# Patient Record
Sex: MALE | Race: WHITE | Employment: FULL TIME | URBAN - METROPOLITAN AREA
[De-identification: names, ages, dates, MRNs, and addresses within clinical notes are randomized per-mention and may not be internally consistent; named-entity substitution may affect disease eponyms.]

---

## 2018-01-02 ENCOUNTER — TELEPHONE (OUTPATIENT)
Dept: OPHTHALMOLOGY | Facility: CLINIC | Age: 38
End: 2018-01-02

## 2018-01-02 NOTE — TELEPHONE ENCOUNTER
Pt requesting an appt. Pt's right eye iris is inflamed. Pt was seen in the past for iritis in the left eye.   Call pt to advise

## 2018-01-03 ENCOUNTER — OFFICE VISIT (OUTPATIENT)
Dept: OPHTHALMOLOGY | Facility: CLINIC | Age: 38
End: 2018-01-03

## 2018-01-03 DIAGNOSIS — H20.9 IRITIS: Primary | ICD-10-CM

## 2018-01-03 PROCEDURE — 99213 OFFICE O/P EST LOW 20 MIN: CPT | Performed by: OPHTHALMOLOGY

## 2018-01-03 PROCEDURE — 99212 OFFICE O/P EST SF 10 MIN: CPT | Performed by: OPHTHALMOLOGY

## 2018-01-03 NOTE — ASSESSMENT & PLAN NOTE
Used Pred Forte one drop every 2 hours while awake  x 1 week. Homatropine instilled in the office today. Informed pt that his right eye will be dilated for 3-4 days.

## 2018-01-03 NOTE — PATIENT INSTRUCTIONS
Iritis  Used Pred Forte one drop every 2 hours while awake  x 1 week. Homatropine instilled in the office today. Informed pt that his right eye will be dilated for 3-4 days.

## 2018-01-03 NOTE — PROGRESS NOTES
Prasanna Tolbert is a 40year old male. HPI:     HPI     Pt called yesterday complaining of pain 5/10, redness, light sensitivity and slight blurred vision OD for 6 days.  Pt has Hx of iritis OS and has been using left over Pred Forte 3 times a day OD bu every 2 hours while awake  x 1 week. Homatropine instilled in the office today. Informed pt that his right eye will be dilated for 3-4 days. No orders of the defined types were placed in this encounter.       Meds This Visit:    No prescriptions req

## 2018-01-10 ENCOUNTER — OFFICE VISIT (OUTPATIENT)
Dept: OPHTHALMOLOGY | Facility: CLINIC | Age: 38
End: 2018-01-10

## 2018-01-10 DIAGNOSIS — H20.00 ACUTE IRITIS, RIGHT EYE: Primary | ICD-10-CM

## 2018-01-10 PROCEDURE — 99213 OFFICE O/P EST LOW 20 MIN: CPT | Performed by: OPHTHALMOLOGY

## 2018-01-10 PROCEDURE — 99212 OFFICE O/P EST SF 10 MIN: CPT | Performed by: OPHTHALMOLOGY

## 2018-01-10 RX ORDER — PREDNISOLONE ACETATE 10 MG/ML
1 SUSPENSION/ DROPS OPHTHALMIC 4 TIMES DAILY
Qty: 1 BOTTLE | Refills: 0 | Status: SHIPPED | OUTPATIENT
Start: 2018-01-10 | End: 2018-01-23

## 2018-01-10 NOTE — ASSESSMENT & PLAN NOTE
Since this is the 2nd episode of iritis, to consider referral to rheumatologist for iritis work up. Patient has an appointment with Dr. Saloni Vera next week. Patient states his mother has ankylosing spondylitis.     Iritis in the right eye is improve

## 2018-01-10 NOTE — PATIENT INSTRUCTIONS
Acute iritis, right eye  Since this is the 2nd episode of iritis, to consider referral to rheumatologist for iritis work up. Patient has an appointment with Dr. Vail Overall next week. Patient states his mother has ankylosing spondylitis.     Iritis in

## 2018-01-10 NOTE — PROGRESS NOTES
Demi Mitchell is a 40year old male. HPI:     HPI     Patient is here for a recheck of iritis OD. Patient is using Pred Forte OD every 2 hours (ramesht used it last night @10pm).   Patient states that the eye feels better but he still has blurry vis Disc Good rim, Temporal crescent     C/D Ratio 0.4             Refraction     Wearing Rx       Sphere Cylinder Axis    Right -5.50 +0.00 000    Left -5.50 +0.00 000    Type:  Single vision                 ASSESSMENT/PLAN:     Diagnoses and Plan:     Acute

## 2018-01-23 ENCOUNTER — OFFICE VISIT (OUTPATIENT)
Dept: OPHTHALMOLOGY | Facility: CLINIC | Age: 38
End: 2018-01-23

## 2018-01-23 DIAGNOSIS — H20.00 ACUTE IRITIS, RIGHT EYE: Primary | ICD-10-CM

## 2018-01-23 PROCEDURE — 99213 OFFICE O/P EST LOW 20 MIN: CPT | Performed by: OPHTHALMOLOGY

## 2018-01-23 PROCEDURE — 99212 OFFICE O/P EST SF 10 MIN: CPT | Performed by: OPHTHALMOLOGY

## 2018-01-23 RX ORDER — PREDNISOLONE ACETATE 10 MG/ML
SUSPENSION/ DROPS OPHTHALMIC
Qty: 1 BOTTLE | Refills: 0 | Status: SHIPPED | OUTPATIENT
Start: 2018-01-23 | End: 2018-02-13

## 2018-01-23 NOTE — PATIENT INSTRUCTIONS
Acute iritis, right eye  Improved. Patient has an appointment with Dr. Chriss Durán on 2/7/18. Begin to wean off of Pred forte.    Use 1 drop 3 times a day in the right eye for 7 days, then 2 times a day for 7 days, then once a day for 7 days, then discon

## 2018-01-23 NOTE — PROGRESS NOTES
Jen Zamora is a 40year old male. HPI:     HPI     Patient is here for a recheck of iritis OD. Patient is using Pred forte OD QID. He complains of occasional blurred vision in the right eye, but states his right eye feels much better.   Patient h Iris Normal Normal    Lens minimal pigment on anterior capsule  Clear          Fundus Exam       Right Left    Disc Good rim, Temporal crescent     C/D Ratio 0.4             Refraction     Wearing Rx       Sphere Cylinder Axis    Right -5.50 +0.00 000

## 2018-01-23 NOTE — ASSESSMENT & PLAN NOTE
Improved. Patient has an appointment with Dr. Aaron Garcia on 2/7/18. Begin to wean off of Pred forte. Use 1 drop 3 times a day in the right eye for 7 days, then 2 times a day for 7 days, then once a day for 7 days, then discontinue.   Discussed with pa

## 2018-02-07 ENCOUNTER — HOSPITAL ENCOUNTER (OUTPATIENT)
Dept: GENERAL RADIOLOGY | Facility: HOSPITAL | Age: 38
Discharge: HOME | End: 2018-02-07
Attending: INTERNAL MEDICINE
Payer: COMMERCIAL

## 2018-02-07 ENCOUNTER — OFFICE VISIT (OUTPATIENT)
Dept: RHEUMATOLOGY | Facility: CLINIC | Age: 38
End: 2018-02-07

## 2018-02-07 ENCOUNTER — APPOINTMENT (OUTPATIENT)
Dept: LAB | Facility: HOSPITAL | Age: 38
End: 2018-02-07
Attending: INTERNAL MEDICINE
Payer: COMMERCIAL

## 2018-02-07 VITALS
BODY MASS INDEX: 31.92 KG/M2 | HEIGHT: 71 IN | DIASTOLIC BLOOD PRESSURE: 86 MMHG | WEIGHT: 228 LBS | SYSTOLIC BLOOD PRESSURE: 144 MMHG | TEMPERATURE: 97 F | HEART RATE: 69 BPM

## 2018-02-07 DIAGNOSIS — M54.89 INFLAMMATORY BACK PAIN: ICD-10-CM

## 2018-02-07 DIAGNOSIS — H20.9 IRITIS: Primary | ICD-10-CM

## 2018-02-07 DIAGNOSIS — H20.9 IRITIS: ICD-10-CM

## 2018-02-07 LAB
ALBUMIN SERPL BCP-MCNC: 4.3 G/DL (ref 3.5–4.8)
ALBUMIN/GLOB SERPL: 1.7 {RATIO} (ref 1–2)
ALP SERPL-CCNC: 75 U/L (ref 32–100)
ALT SERPL-CCNC: 40 U/L (ref 17–63)
ANION GAP SERPL CALC-SCNC: 7 MMOL/L (ref 0–18)
AST SERPL-CCNC: 29 U/L (ref 15–41)
BILIRUB SERPL-MCNC: 0.3 MG/DL (ref 0.3–1.2)
BUN SERPL-MCNC: 14 MG/DL (ref 8–20)
BUN/CREAT SERPL: 14.1 (ref 10–20)
CALCIUM SERPL-MCNC: 9 MG/DL (ref 8.5–10.5)
CHLORIDE SERPL-SCNC: 104 MMOL/L (ref 95–110)
CK SERPL-CCNC: 180 U/L (ref 49–397)
CO2 SERPL-SCNC: 29 MMOL/L (ref 22–32)
CREAT SERPL-MCNC: 0.99 MG/DL (ref 0.5–1.5)
CRP SERPL-MCNC: <0.5 MG/DL (ref 0–0.9)
ERYTHROCYTE [DISTWIDTH] IN BLOOD BY AUTOMATED COUNT: 12.7 % (ref 11–15)
ERYTHROCYTE [SEDIMENTATION RATE] IN BLOOD: 4 MM/HR (ref 0–15)
GLOBULIN PLAS-MCNC: 2.5 G/DL (ref 2.5–3.7)
GLUCOSE SERPL-MCNC: 98 MG/DL (ref 70–99)
HCT VFR BLD AUTO: 46 % (ref 41–52)
HGB BLD-MCNC: 15.8 G/DL (ref 13.5–17.5)
MCH RBC QN AUTO: 29.7 PG (ref 27–32)
MCHC RBC AUTO-ENTMCNC: 34.4 G/DL (ref 32–37)
MCV RBC AUTO: 86.2 FL (ref 80–100)
OSMOLALITY UR CALC.SUM OF ELEC: 290 MOSM/KG (ref 275–295)
PATIENT FASTING: NO
PLATELET # BLD AUTO: 204 K/UL (ref 140–400)
PMV BLD AUTO: 8.7 FL (ref 7.4–10.3)
POTASSIUM SERPL-SCNC: 4 MMOL/L (ref 3.3–5.1)
PROT SERPL-MCNC: 6.8 G/DL (ref 5.9–8.4)
RBC # BLD AUTO: 5.34 M/UL (ref 4.5–5.9)
RHEUMATOID FACT SER QL: <5 IU/ML
SODIUM SERPL-SCNC: 140 MMOL/L (ref 136–144)
WBC # BLD AUTO: 5.9 K/UL (ref 4–11)

## 2018-02-07 PROCEDURE — 82164 ANGIOTENSIN I ENZYME TEST: CPT

## 2018-02-07 PROCEDURE — 99244 OFF/OP CNSLTJ NEW/EST MOD 40: CPT | Performed by: INTERNAL MEDICINE

## 2018-02-07 PROCEDURE — 82550 ASSAY OF CK (CPK): CPT

## 2018-02-07 PROCEDURE — 86431 RHEUMATOID FACTOR QUANT: CPT

## 2018-02-07 PROCEDURE — 86812 HLA TYPING A B OR C: CPT

## 2018-02-07 PROCEDURE — 86140 C-REACTIVE PROTEIN: CPT

## 2018-02-07 PROCEDURE — 86618 LYME DISEASE ANTIBODY: CPT

## 2018-02-07 PROCEDURE — 86803 HEPATITIS C AB TEST: CPT

## 2018-02-07 PROCEDURE — 36415 COLL VENOUS BLD VENIPUNCTURE: CPT

## 2018-02-07 PROCEDURE — 86480 TB TEST CELL IMMUN MEASURE: CPT

## 2018-02-07 PROCEDURE — 86705 HEP B CORE ANTIBODY IGM: CPT

## 2018-02-07 PROCEDURE — 87340 HEPATITIS B SURFACE AG IA: CPT

## 2018-02-07 PROCEDURE — 86038 ANTINUCLEAR ANTIBODIES: CPT

## 2018-02-07 PROCEDURE — 72070 X-RAY EXAM THORAC SPINE 2VWS: CPT | Performed by: INTERNAL MEDICINE

## 2018-02-07 PROCEDURE — 85027 COMPLETE CBC AUTOMATED: CPT

## 2018-02-07 PROCEDURE — 80053 COMPREHEN METABOLIC PANEL: CPT

## 2018-02-07 PROCEDURE — 72202 X-RAY EXAM SI JOINTS 3/> VWS: CPT | Performed by: INTERNAL MEDICINE

## 2018-02-07 PROCEDURE — 85652 RBC SED RATE AUTOMATED: CPT

## 2018-02-07 PROCEDURE — 85549 MURAMIDASE: CPT

## 2018-02-07 PROCEDURE — 86200 CCP ANTIBODY: CPT

## 2018-02-07 PROCEDURE — 99212 OFFICE O/P EST SF 10 MIN: CPT | Performed by: INTERNAL MEDICINE

## 2018-02-07 PROCEDURE — 86780 TREPONEMA PALLIDUM: CPT

## 2018-02-07 RX ORDER — IBUPROFEN 200 MG
600 TABLET ORAL NIGHTLY PRN
COMMUNITY

## 2018-02-07 RX ORDER — CETIRIZINE HYDROCHLORIDE 10 MG/1
10 TABLET ORAL DAILY
COMMUNITY

## 2018-02-07 RX ORDER — NAPROXEN SODIUM 220 MG
TABLET ORAL AS NEEDED
COMMUNITY
End: 2021-04-09

## 2018-02-07 NOTE — PATIENT INSTRUCTIONS
1. Check labs  2. Cont. ibuporfen 600mg twice a day   3. Check xrays   4. Return to clinic in 2 weeks.

## 2018-02-07 NOTE — PROGRESS NOTES
Xavier Irving is a 40year old male who presents for Patient presents with:  Joint Pain: past dx iritis  Back Pain: mid  Hip Pain: right  . HPI:     I had the pleasure of seeing Xavier Irving on 2/7/2018 for evaluation.  He was referred by Dr. Hank Diane ibuprofen 200 MG Oral Tab Take 600 mg by mouth nightly as needed for Pain. Disp:  Rfl:    Naproxen Sodium (ALEVE) 220 MG Oral Tab Take by mouth as needed.  Disp:  Rfl:    prednisoLONE acetate (PRED FORTE) 1 % Ophthalmic Suspension Instill 1 drop into the heartburn, no dyshpagia, no BRBPR or melena  When he eats breads or sandwhiches - he can get heart burn.    : no dysuria,   Neuro: No numbness or tingling, no headache, no hx of seizures,   Psych: no hx of anxiety or depression  ENDO: no hx of thyroid dis with:  Joint Pain: past dx iritis  Back Pain: mid  Hip Pain: right    1.  Recurrent irtiis - hx of intnermittent back pain and righ gulshan apin -   Fh of AS  - Check labs to evaluate for inflammatory arthritis and/or connective tissue disease  That would caus

## 2018-02-08 LAB
CCP IGG SERPL-ACNC: 2 U/ML (ref 0–6.9)
HBV CORE IGM SERPL QL IA: NONREACTIVE
HBV SURFACE AG SERPL QL IA: NONREACTIVE
HCV AB SERPL QL IA: NONREACTIVE
NUCLEAR IGG TITR SER IF: NEGATIVE {TITER}

## 2018-02-09 LAB
ANGIOTENSIN CONVERTING ENZYME: 59 U/L
B BURGDOR IGG+IGM SER QL: NEGATIVE
HLA-B27: POSITIVE
M TB IFN-G CD4+ BCKGRND COR BLD-ACNC: 0.02 IU/ML
M TB IFN-G CD4+ T-CELLS BLD-ACNC: 0.06 IU/ML
M TB TUBERC IFN-G BLD QL: NEGATIVE
M TB TUBERC IGNF/MITOGEN IGNF CONTROL: >10 IU/ML
T PALLIDUM AB SER QL: NEGATIVE

## 2018-02-12 ENCOUNTER — TELEPHONE (OUTPATIENT)
Dept: RHEUMATOLOGY | Facility: CLINIC | Age: 38
End: 2018-02-12

## 2018-02-12 LAB — LYSOZYME, SERUM OR BODY FLUID: 1.2 UG/ML

## 2018-02-21 ENCOUNTER — HOSPITAL ENCOUNTER (OUTPATIENT)
Dept: GENERAL RADIOLOGY | Facility: HOSPITAL | Age: 38
Discharge: HOME | End: 2018-02-21
Attending: INTERNAL MEDICINE
Payer: COMMERCIAL

## 2018-02-21 ENCOUNTER — OFFICE VISIT (OUTPATIENT)
Dept: RHEUMATOLOGY | Facility: CLINIC | Age: 38
End: 2018-02-21

## 2018-02-21 ENCOUNTER — TELEPHONE (OUTPATIENT)
Dept: RHEUMATOLOGY | Facility: CLINIC | Age: 38
End: 2018-02-21

## 2018-02-21 VITALS
HEART RATE: 74 BPM | BODY MASS INDEX: 32.06 KG/M2 | SYSTOLIC BLOOD PRESSURE: 143 MMHG | DIASTOLIC BLOOD PRESSURE: 83 MMHG | WEIGHT: 229 LBS | HEIGHT: 71 IN

## 2018-02-21 DIAGNOSIS — M25.551 RIGHT HIP PAIN: ICD-10-CM

## 2018-02-21 DIAGNOSIS — M45.9 ANKYLOSING SPONDYLITIS, UNSPECIFIED SITE OF SPINE (HCC): Primary | ICD-10-CM

## 2018-02-21 PROCEDURE — 73502 X-RAY EXAM HIP UNI 2-3 VIEWS: CPT | Performed by: INTERNAL MEDICINE

## 2018-02-21 PROCEDURE — 99212 OFFICE O/P EST SF 10 MIN: CPT | Performed by: INTERNAL MEDICINE

## 2018-02-21 PROCEDURE — 99214 OFFICE O/P EST MOD 30 MIN: CPT | Performed by: INTERNAL MEDICINE

## 2018-02-21 NOTE — PROGRESS NOTES
Vladimir Young is a 40year old male who presents for Patient presents with:  Hip Pain:  right,Iritis  Results  . HPI:     I had the pleasure of seeing Vladimir Young on 2/7/2018 for evaluation. He was referred by Dr. Aaron Bang.      Over the last 1 1/ 10 MG Oral Tab Take 10 mg by mouth daily. Disp:  Rfl:    ibuprofen 200 MG Oral Tab Take 600 mg by mouth nightly as needed for Pain. Disp:  Rfl:    Naproxen Sodium (ALEVE) 220 MG Oral Tab Take by mouth as needed.  Disp:  Rfl:    Fexofenadine-Pseudoephed ER 1 when he was younger he used to get folloiciulitis it goes away -   All other ROS are negative.      EXAM:   /83 (BP Location: Right arm, Patient Position: Sitting, Cuff Size: adult)   Pulse 74   Ht 5' 11\" (1.803 m)   Wt 229 lb (103.9 kg)   BMI 31.94 but postponed   - plan to start biologics - humira 40mg every 2 weeks. - check mri si joints -     2. oa of hips - had xrays with dr. Karissa Sykes and did not get the arthrsocpic sx - in 2016 -       Summary:  1. Check mri si joints   2.  Planning humira 40mg e

## 2018-02-21 NOTE — PATIENT INSTRUCTIONS
1. Check mri si joints   2. Planning humira 40mg every other 2 weeks. 3. Info on humira   4. Return to clinic in 1 month. Adalimumab Injection  Brand Names: Toney Zarate  What is this medicine?   ADALIMUMAB (a rachele smiley mab) is used to treat rheumat · unusual bleeding or bruising  · unusually weak or tired  Side effects that usually do not require medical attention (report to your doctor or health care professional if they continue or are bothersome):  · headache  · nausea  · redness, itching, swellin What should I watch for while using this medicine? Visit your doctor or health care professional for regular checks on your progress. Tell your doctor or healthcare professional if your symptoms do not start to get better or if they get worse.   You will b

## 2018-02-22 ENCOUNTER — TELEPHONE (OUTPATIENT)
Dept: RHEUMATOLOGY | Facility: CLINIC | Age: 38
End: 2018-02-22

## 2018-02-22 NOTE — TELEPHONE ENCOUNTER
Insurance contacted at [phone]. No PA required ([phone]). Ref#[id number]. Order faxed to [facility] phone [phone] fax [phone]. Left message for pt approved and he can schedule MRI.

## 2018-02-27 ENCOUNTER — TELEPHONE (OUTPATIENT)
Dept: RHEUMATOLOGY | Facility: CLINIC | Age: 38
End: 2018-02-27

## 2018-02-27 NOTE — TELEPHONE ENCOUNTER
Ok to let him know. 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

## 2018-02-27 NOTE — TELEPHONE ENCOUNTER
Pt had MRI completed yesterday. Scheduled teaching for 2/28 at 9 am in [facility].

## 2018-03-01 ENCOUNTER — TELEPHONE (OUTPATIENT)
Dept: OPHTHALMOLOGY | Facility: CLINIC | Age: 38
End: 2018-03-01

## 2018-03-01 ENCOUNTER — NURSE ONLY (OUTPATIENT)
Dept: RHEUMATOLOGY | Facility: CLINIC | Age: 38
End: 2018-03-01

## 2018-03-01 DIAGNOSIS — M45.9 ANKYLOSING SPONDYLITIS, UNSPECIFIED SITE OF SPINE (HCC): Primary | ICD-10-CM

## 2018-03-01 PROCEDURE — 99211 OFF/OP EST MAY X REQ PHY/QHP: CPT | Performed by: INTERNAL MEDICINE

## 2018-03-01 NOTE — PROGRESS NOTES
Patient came in for [facility] teaching. Name and  verified. Patient educated on proper handling/storage/disposal of medications. Patient informed of proper injection and aseptic technique. Patient educated on potential side effects.  Educational materials g

## 2018-03-01 NOTE — PATIENT INSTRUCTIONS
Adalimumab Injection  Brand Names: Florentino Villela  What is this medicine? ADALIMUMAB (a rachele AYE mu mab) is used to treat rheumatoid and psoriatic arthritis.  It is also used to treat ankylosing spondylitis, Crohn's disease, ulcerative colitis, plaque ps Side effects that usually do not require medical attention (report to your doctor or health care professional if they continue or are bothersome):  · headache  · nausea  · redness, itching, swelling, or bruising at site where injected  What may interact wi Visit your doctor or health care professional for regular checks on your progress. Tell your doctor or healthcare professional if your symptoms do not start to get better or if they get worse.   You will be tested for tuberculosis (TB) before you start this

## 2018-04-02 ENCOUNTER — HOSPITAL ENCOUNTER (EMERGENCY)
Facility: HOSPITAL | Age: 38
Discharge: HOME | End: 2018-04-02
Attending: EMERGENCY MEDICINE
Payer: COMMERCIAL

## 2018-04-02 VITALS
HEART RATE: 70 BPM | HEIGHT: 71 IN | RESPIRATION RATE: 18 BRPM | TEMPERATURE: 98 F | OXYGEN SATURATION: 100 % | WEIGHT: 220 LBS | SYSTOLIC BLOOD PRESSURE: 133 MMHG | BODY MASS INDEX: 30.8 KG/M2 | DIASTOLIC BLOOD PRESSURE: 89 MMHG

## 2018-04-02 DIAGNOSIS — S01.81XA FACIAL LACERATION, INITIAL ENCOUNTER: Primary | ICD-10-CM

## 2018-04-02 PROCEDURE — 90471 IMMUNIZATION ADMIN: CPT

## 2018-04-02 PROCEDURE — 12011 RPR F/E/E/N/L/M 2.5 CM/<: CPT

## 2018-04-02 PROCEDURE — 99283 EMERGENCY DEPT VISIT LOW MDM: CPT

## 2018-04-02 RX ORDER — LIDOCAINE HYDROCHLORIDE AND EPINEPHRINE 20; 5 MG/ML; UG/ML
20 INJECTION, SOLUTION EPIDURAL; INFILTRATION; INTRACAUDAL; PERINEURAL ONCE
Status: COMPLETED | OUTPATIENT
Start: 2018-04-02 | End: 2018-04-02

## 2018-04-02 RX ORDER — DIAPER,BRIEF,INFANT-TODD,DISP
EACH MISCELLANEOUS ONCE
Status: COMPLETED | OUTPATIENT
Start: 2018-04-02 | End: 2018-04-02

## 2018-04-03 ENCOUNTER — TELEPHONE (OUTPATIENT)
Dept: RHEUMATOLOGY | Facility: CLINIC | Age: 38
End: 2018-04-03

## 2018-04-03 DIAGNOSIS — Z51.81 ENCOUNTER FOR THERAPEUTIC DRUG MONITORING: ICD-10-CM

## 2018-04-03 DIAGNOSIS — M45.9 ANKYLOSING SPONDYLITIS, UNSPECIFIED SITE OF SPINE (HCC): Primary | ICD-10-CM

## 2018-04-03 NOTE — ED PROVIDER NOTES
Patient Seen in: [facility] AND [facility] Emergency Department    History   Patient presents with:  Laceration Abrasion (integumentary)    Stated Complaint: left face lac      HPI    40 yo M with PMH ankylosing spondylitis on humira presmeting for evaluation of l signs reviewed. All other systems reviewed and negative except as noted above. PSFH elements reviewed from today and agreed except as otherwise stated in HPI.     Physical Exam   ED Triage Vitals [04/02/18 1234]  BP: 145/75  Pulse: 95  Resp: 18  Tem laceration, initial encounter  (primary encounter diagnosis)    Disposition:  Discharge    Follow-up:  [address] [address]  Go to  Followup in 3-5 days for wound check and suture removal.      Medicatio

## 2018-04-03 NOTE — ED NOTES
Pt reports he was hit in the face next to left eye by a hockey puck. Laceration approximately 1.5 cm long. Bleeding controlled at this time. Pt denies any visual disturbances. Denies LOC.

## 2018-04-03 NOTE — ED INITIAL ASSESSMENT (HPI)
c/o L eye lac since tonight after being struck in face with puck during hockey, denies loc, denies visual disturbance

## 2018-04-04 NOTE — TELEPHONE ENCOUNTER
Pt states went to ER on 4/3/2018 and got a tetnus shot and stitches in his face, he wanted to know will the tetnus shot effect the Humira/ labs.

## 2018-04-05 ENCOUNTER — LAB ENCOUNTER (OUTPATIENT)
Dept: LAB | Facility: HOSPITAL | Age: 38
End: 2018-04-05
Attending: INTERNAL MEDICINE
Payer: COMMERCIAL

## 2018-04-05 DIAGNOSIS — M45.9 ANKYLOSING SPONDYLITIS, UNSPECIFIED SITE OF SPINE (HCC): ICD-10-CM

## 2018-04-05 DIAGNOSIS — Z51.81 ENCOUNTER FOR THERAPEUTIC DRUG MONITORING: ICD-10-CM

## 2018-04-05 PROCEDURE — 85652 RBC SED RATE AUTOMATED: CPT

## 2018-04-05 PROCEDURE — 84450 TRANSFERASE (AST) (SGOT): CPT

## 2018-04-05 PROCEDURE — 82040 ASSAY OF SERUM ALBUMIN: CPT

## 2018-04-05 PROCEDURE — 84460 ALANINE AMINO (ALT) (SGPT): CPT

## 2018-04-05 PROCEDURE — 36415 COLL VENOUS BLD VENIPUNCTURE: CPT

## 2018-04-05 PROCEDURE — 82565 ASSAY OF CREATININE: CPT

## 2018-04-05 PROCEDURE — 85025 COMPLETE CBC W/AUTO DIFF WBC: CPT

## 2018-04-05 PROCEDURE — 86140 C-REACTIVE PROTEIN: CPT

## 2018-04-09 ENCOUNTER — OFFICE VISIT (OUTPATIENT)
Dept: RHEUMATOLOGY | Facility: CLINIC | Age: 38
End: 2018-04-09

## 2018-04-09 VITALS
WEIGHT: 217 LBS | DIASTOLIC BLOOD PRESSURE: 77 MMHG | SYSTOLIC BLOOD PRESSURE: 124 MMHG | BODY MASS INDEX: 30.38 KG/M2 | HEIGHT: 71 IN | HEART RATE: 59 BPM

## 2018-04-09 DIAGNOSIS — M45.9 ANKYLOSING SPONDYLITIS, UNSPECIFIED SITE OF SPINE (HCC): Primary | ICD-10-CM

## 2018-04-09 DIAGNOSIS — Z51.81 ENCOUNTER FOR THERAPEUTIC DRUG MONITORING: ICD-10-CM

## 2018-04-09 PROCEDURE — 99212 OFFICE O/P EST SF 10 MIN: CPT | Performed by: INTERNAL MEDICINE

## 2018-04-09 PROCEDURE — 99214 OFFICE O/P EST MOD 30 MIN: CPT | Performed by: INTERNAL MEDICINE

## 2018-04-09 NOTE — PROGRESS NOTES
Tracy Atkins is a 41year old male who presents for Patient presents with: Ankylosing Spondylitis  Joint Pain  . HPI:     I had the pleasure of seeing Tracy Atkins on 2/7/2018 for evaluation. He was referred by Dr. Maryam Dang.      Over the last 1 better. He has no pain now. His pain used to be 5/10 pain. It varied by day. He has noticed a minor cold/sinus thing one day after injeciton. No ibuporfen or aleve since started ibuporfne. He's had no injection site reactions.          Wt Readings no psoriasis  He gets dry skin on his thighs.    HEENT: No dry eyes, no dry mouth, no Raynaud's, no nasal ulcers, no parotid swelling, no neck pain, no jaw pain, no temple pain  Eyes: No visual changes,   CVS: No chest pain, no heart disease  RS: No SOB, no PHOSPHATASE      32 - 100 U/L 75   Total Bilirubin      0.3 - 1.2 mg/dL 0.3   TOTAL PROTEIN      5.9 - 8.4 g/dL 6.8   Albumin      3.5 - 4.8 g/dL 4.3   GLOBULIN, TOTAL      2.5 - 3.7 g/dL 2.5   A/G RATIO      1.0 - 2.0 1.7   ANION GAP      0 - 18 mmol/L 7 217   MEAN PLATELET VOLUME      7.4 - 10.3 fL 9.5   Neutrophils %      % 61   Lymphocytes %      % 29   Monocytes %      % 8   Eosinophils %      % 2   Basophils %      % 1   Neutrophils Absolute      1.8 - 7.7 K/UL 4.5   Lymphocytes Absolute      1.0 - 4. 2016 but postponed   - plan to started on 3/1/2018 -  humira 40mg every 2 weeks.    - check mri si joints - will get [facility] from [facility] -     2. oa of hips - had xrays with dr. Lg Bowman and did not get the arthrsocpic sx - in 2016 -     3. iriits - bet

## 2018-04-09 NOTE — PATIENT INSTRUCTIONS
1.  Will discuss mri si joints -   2. Cont.  humira 40mg every other 2 weeks. 3. Return to clinic in  6 months.

## 2018-08-14 NOTE — TELEPHONE ENCOUNTER
LOV: 4-9-18  Last Refilled:#2 pens, 5rfs 2/27/18    Future Appointments  Date Time Provider Bibiana Keen   10/8/2018 11:00 AM Mady Olea MD [facility]       Please advise.

## 2018-08-15 RX ORDER — ADALIMUMAB 40MG/0.8ML
KIT SUBCUTANEOUS
Qty: 2 EACH | Refills: 5 | Status: SHIPPED | OUTPATIENT
Start: 2018-08-15 | End: 2018-08-20

## 2018-08-20 RX ORDER — ADALIMUMAB 40MG/0.8ML
KIT SUBCUTANEOUS
Qty: 2 EACH | Refills: 5 | Status: SHIPPED | OUTPATIENT
Start: 2018-08-20 | End: 2019-01-30

## 2018-08-20 NOTE — TELEPHONE ENCOUNTER
LOV:4-9  Last Filled:8-15, #2 with 5 refills  Labs:   Future Appointments  Date Time Provider Bibiana Keen   10/8/2018 11:00 AM Brendan Terrell MD 2014 [facility]       Please Advise

## 2018-08-22 ENCOUNTER — TELEPHONE (OUTPATIENT)
Dept: RHEUMATOLOGY | Facility: CLINIC | Age: 38
End: 2018-08-22

## 2018-08-22 NOTE — TELEPHONE ENCOUNTER
Spoke with pt and he does not have a pen for 8/30 injection. Can we sent script to [facility] for 1 pen?? He will call office the first week of September with new insurance info for PA. Please advise.

## 2018-08-22 NOTE — TELEPHONE ENCOUNTER
Pt said he is due for next [address] [place] [address] injection 8/30    Right now does not have ins coverage- will have new ins 9/1/18    Questioning if can he wait until after new ins starts for next injection ?     Or if any other options- possible to  get one Humeria pen at

## 2018-08-29 NOTE — TELEPHONE ENCOUNTER
Phoned pt and advised him to call [facility] to follow up on delivery of pen. Pt instructed to call back if he encounters any issues.

## 2018-08-29 NOTE — TELEPHONE ENCOUNTER
Pt states did not receive the pen from [facility] they reached out to him but still has not received    Due for injection tomorrow    Asking what his options are?

## 2018-08-29 NOTE — TELEPHONE ENCOUNTER
Pt stated Jamie Samples will be sending paperwork to provider's office. Pt encouraged to update insurance information for office to proceed with authorization.

## 2018-08-30 RX ORDER — ADALIMUMAB 40MG/0.8ML
KIT SUBCUTANEOUS
Qty: 2 EACH | OUTPATIENT
Start: 2018-08-30

## 2018-08-30 NOTE — TELEPHONE ENCOUNTER
LOV:4-9  Last Filled:8-20, #2 with 5 refills Phoned [facility] and spoke to Bibiana Santoyo. Disregard request they received the refill on 8-20. Labs:   Future Appointments  Date Time Provider Bibiana Keen   10/8/2018 11:00 AM Aimee Russ MD [facility]

## 2018-08-31 ENCOUNTER — TELEPHONE (OUTPATIENT)
Dept: RHEUMATOLOGY | Facility: CLINIC | Age: 38
End: 2018-08-31

## 2018-09-06 NOTE — TELEPHONE ENCOUNTER
PA approved from 9/1/2018 to 9/1/2019. Case # [id number]. Pt phoned and updated- verbalizes understanding, no further questions.

## 2018-10-08 ENCOUNTER — TELEPHONE (OUTPATIENT)
Dept: RHEUMATOLOGY | Facility: CLINIC | Age: 38
End: 2018-10-08

## 2018-10-08 ENCOUNTER — OFFICE VISIT (OUTPATIENT)
Dept: RHEUMATOLOGY | Facility: CLINIC | Age: 38
End: 2018-10-08
Payer: COMMERCIAL

## 2018-10-08 VITALS
WEIGHT: 222 LBS | BODY MASS INDEX: 31.08 KG/M2 | DIASTOLIC BLOOD PRESSURE: 72 MMHG | SYSTOLIC BLOOD PRESSURE: 111 MMHG | HEART RATE: 60 BPM | HEIGHT: 71 IN

## 2018-10-08 DIAGNOSIS — Z51.81 ENCOUNTER FOR THERAPEUTIC DRUG MONITORING: ICD-10-CM

## 2018-10-08 DIAGNOSIS — M45.9 ANKYLOSING SPONDYLITIS, UNSPECIFIED SITE OF SPINE (HCC): Primary | ICD-10-CM

## 2018-10-08 PROCEDURE — 99214 OFFICE O/P EST MOD 30 MIN: CPT | Performed by: INTERNAL MEDICINE

## 2018-10-08 PROCEDURE — 99212 OFFICE O/P EST SF 10 MIN: CPT | Performed by: INTERNAL MEDICINE

## 2018-10-08 NOTE — PROGRESS NOTES
Jessica Grey is a 41year old male who presents for Patient presents with: Ankylosing Spondylitis  Hip Pain: right  . HPI:     I had the pleasure of seeing Jessica Grey on 2/7/2018 for evaluation. He was referred by Dr. Nikolai Burnette.      Over the la . After 3 days the hurmia started felt better. His right ihp is much better. He has no pain now. His pain used to be 5/10 pain. It varied by day. He has noticed a minor cold/sinus thing one day after injeciton.    No ibuporfen or aleve since started i Alcohol/week: 0.0 oz      Comment: Weekly    Drug use: No   2 children,   ,   [employer]el software project enginer,        REVIEW OF SYSTEMS:   Review Of Systems:  Fatigue  Constitutional:No fever, no change in weight or appetitie  Derm: No rashes, us 136 - 144 mmol/L 140   Potassium      3.3 - 5.1 mmol/L 4.0   Chloride      95 - 110 mmol/L 104   Carbon Dioxide, Total      22 - 32 mmol/L 29   BUN      8 - 20 mg/dL 14   CREATININE      0.50 - 1.50 mg/dL 0.99   CALCIUM      8.5 - 10.5 mg/dL 9.0   ALT Latest Ref Rng & Units 4/5/2018   WBC      4.0 - 11.0 K/UL 7.5   RBC      4.50 - 5.90 M/UL 5.46   Hemoglobin      13.5 - 17.5 g/dL 16.4   Hematocrit      41.0 - 52.0 % 47.7   MCV      80.0 - 100.0 fL 87.3   MCH      27.0 - 32.0 pg 30.1   MCHC      32. 0 Gely Lazo is a 41year old male who presents for Patient presents with: Ankylosing Spondylitis  Hip Pain: right    1.  Recurrent irtiis - hx of intnermittent back pain and righ thip apin - hlab27 positive - likely AS -   [facility]h of AS  Doing better - ashly

## 2018-10-08 NOTE — TELEPHONE ENCOUNTER
Contacted [employer] to initiate PA. Per representative [place], [place] approval on file from previous account through 2/26/2019. Approval was transferred to current, active account.  Case #[id number]    Contacted [facility] representative Tamela Aleman to update

## 2018-10-08 NOTE — PATIENT INSTRUCTIONS
1. Cont.  humira 40mg every other 2 weeks. 2. Return to clinic in  6 months. - f/u in April   3.  Labs on next visit

## 2019-01-31 RX ORDER — ADALIMUMAB 40MG/0.8ML
KIT SUBCUTANEOUS
Qty: 2 EACH | Refills: 5 | Status: SHIPPED | OUTPATIENT
Start: 2019-01-31 | End: 2019-07-22

## 2019-01-31 NOTE — TELEPHONE ENCOUNTER
Requested medication: HUMIRA PEN 40 MG/0.8ML Subcutaneous Pen-injector Kit  Last refilled: 8/20/18 #2 each 5 refills  LOV: 10/8/18  Future Appointments   Date Time Provider Bibiana Keen   4/8/2019 10:00 AM Dalton Lizama MD [address] [place] [address]

## 2019-02-22 ENCOUNTER — TELEPHONE (OUTPATIENT)
Dept: RHEUMATOLOGY | Facility: CLINIC | Age: 39
End: 2019-02-22

## 2019-02-22 DIAGNOSIS — Z51.81 ENCOUNTER FOR THERAPEUTIC DRUG MONITORING: ICD-10-CM

## 2019-02-22 DIAGNOSIS — M45.9 ANKYLOSING SPONDYLITIS, UNSPECIFIED SITE OF SPINE (HCC): Primary | ICD-10-CM

## 2019-02-22 NOTE — TELEPHONE ENCOUNTER
Pt has an appointment scheduled with Dr. Cummins Or on 4-8-19 for follow up. Pt would like to know if the doctor would like him to have lab work done prior to his appointment. If so, he would need an order.

## 2019-02-22 NOTE — TELEPHONE ENCOUNTER
Pt currently has standing lab order in the system (expires 4/4/2019). New standing lab order generated- pt aware to complete labs prior to f/u appt on 4/8.

## 2019-02-22 NOTE — TELEPHONE ENCOUNTER
Pt would like the doctor to give him a note stating that he is ok to play sports. Pt would the note added to his my chart. Please, call pt with any questions.

## 2019-02-22 NOTE — TELEPHONE ENCOUNTER
Please see below. 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 Any restrictions? If agreeable, letter pended. Please advise.

## 2019-02-25 NOTE — TELEPHONE ENCOUNTER
Unable to sent letter via [employer] as [place] status is currently pending. [employer] activation link sent via e-mail on file. LM informing pt that requested letter has been mailed to address on file.

## 2019-02-25 NOTE — TELEPHONE ENCOUNTER
Pt states now have MyChart and would like letter submitted there, please call when letter has been submitted.

## 2019-03-13 ENCOUNTER — TELEPHONE (OUTPATIENT)
Dept: RHEUMATOLOGY | Facility: CLINIC | Age: 39
End: 2019-03-13

## 2019-03-13 NOTE — TELEPHONE ENCOUNTER
Received a fax from [facility] that they have been unable to reach him for his next Humira delivery. Phoned patient and he will call the pharmacy today.

## 2019-04-08 ENCOUNTER — LAB ENCOUNTER (OUTPATIENT)
Dept: LAB | Facility: HOSPITAL | Age: 39
End: 2019-04-08
Attending: INTERNAL MEDICINE
Payer: COMMERCIAL

## 2019-04-08 ENCOUNTER — TELEPHONE (OUTPATIENT)
Dept: RHEUMATOLOGY | Facility: CLINIC | Age: 39
End: 2019-04-08

## 2019-04-08 ENCOUNTER — OFFICE VISIT (OUTPATIENT)
Dept: RHEUMATOLOGY | Facility: CLINIC | Age: 39
End: 2019-04-08
Payer: COMMERCIAL

## 2019-04-08 VITALS
HEIGHT: 71 IN | BODY MASS INDEX: 32.62 KG/M2 | HEART RATE: 66 BPM | WEIGHT: 233 LBS | DIASTOLIC BLOOD PRESSURE: 80 MMHG | SYSTOLIC BLOOD PRESSURE: 133 MMHG

## 2019-04-08 DIAGNOSIS — E55.9 VITAMIN D DEFICIENCY: ICD-10-CM

## 2019-04-08 DIAGNOSIS — M45.9 ANKYLOSING SPONDYLITIS, UNSPECIFIED SITE OF SPINE (HCC): ICD-10-CM

## 2019-04-08 DIAGNOSIS — Z51.81 ENCOUNTER FOR THERAPEUTIC DRUG MONITORING: ICD-10-CM

## 2019-04-08 DIAGNOSIS — M45.9 ANKYLOSING SPONDYLITIS, UNSPECIFIED SITE OF SPINE (HCC): Primary | ICD-10-CM

## 2019-04-08 PROCEDURE — 86480 TB TEST CELL IMMUN MEASURE: CPT

## 2019-04-08 PROCEDURE — 85025 COMPLETE CBC W/AUTO DIFF WBC: CPT

## 2019-04-08 PROCEDURE — 82040 ASSAY OF SERUM ALBUMIN: CPT

## 2019-04-08 PROCEDURE — 84460 ALANINE AMINO (ALT) (SGPT): CPT

## 2019-04-08 PROCEDURE — 84450 TRANSFERASE (AST) (SGOT): CPT

## 2019-04-08 PROCEDURE — 86140 C-REACTIVE PROTEIN: CPT

## 2019-04-08 PROCEDURE — 99214 OFFICE O/P EST MOD 30 MIN: CPT | Performed by: INTERNAL MEDICINE

## 2019-04-08 PROCEDURE — 82306 VITAMIN D 25 HYDROXY: CPT

## 2019-04-08 PROCEDURE — 36415 COLL VENOUS BLD VENIPUNCTURE: CPT

## 2019-04-08 PROCEDURE — 82565 ASSAY OF CREATININE: CPT

## 2019-04-08 PROCEDURE — 99212 OFFICE O/P EST SF 10 MIN: CPT | Performed by: INTERNAL MEDICINE

## 2019-04-08 PROCEDURE — 85652 RBC SED RATE AUTOMATED: CPT

## 2019-04-08 RX ORDER — ERGOCALCIFEROL (VITAMIN D2) 1250 MCG
50000 CAPSULE ORAL WEEKLY
Qty: 4 CAPSULE | Refills: 2 | Status: SHIPPED | OUTPATIENT
Start: 2019-04-08 | End: 2019-07-18

## 2019-04-08 NOTE — PROGRESS NOTES
Erich Ramírez is a 46year old male who presents for Patient presents with: Ankylosing Spondylitis  Hip Pain  . HPI:     I had the pleasure of seeing Erich Ramírez on 2/7/2018 for evaluation. He was referred by Dr. Yokasta Rdz.      Over the last 1 1/ . After 3 days the hurmia started felt better. His right ihp is much better. He has no pain now. His pain used to be 5/10 pain. It varied by day. He has noticed a minor cold/sinus thing one day after injeciton.    No ibuporfen or aleve since started i History reviewed. No pertinent surgical history.    Family History   Problem Relation Age of Onset   • Glaucoma Paternal Grandfather    • Heart Disorder Paternal Grandfather    • Other (Other) Mother         Ankylosing spondylitis   • Diabetes Neg    • Macu RS: CTAB, no crackles, no rhonchi  ABD: Soft Non tender, no HSM felt, BS positive  Joint exam:   No jiont tendnerss  Some dryness of hands around knuckles  No si joint tendnerss  schoebers 10-14cm was this   - still  10-15cm -   Finger to floor is 16cm - 49 - 397 U/L 180   LYSOZYME, SERUM OR BODY FLUID      0.00 - 2.75 ug/mL 1.20   OSWALDO SCREEN      Negative Negative   HCV AB      Nonreactive Nonreactive   Lyme Screen IgG and IgM      Negative Negative   HBc IgM AB      Nonreactive Nonreactive   HBSAG SC Partial-thickness undersurface tear of the anterior superior labrum. Bone marrow edema is present in the weightbearing area of the acetabulum deep to an area of full-thickness articular cartilage loss.   FINAL REPORT  THE ATTENDING RADIOLOGIST INTERPRETE

## 2019-04-11 ENCOUNTER — TELEPHONE (OUTPATIENT)
Dept: RHEUMATOLOGY | Facility: CLINIC | Age: 39
End: 2019-04-11

## 2019-04-11 NOTE — TELEPHONE ENCOUNTER
Medication PA Requested:       Humira 40mg - continuation of therapy                                                   Pt insurance/number to contact: [employer]   CoverMyMeds Used: yes   Key: [id number] ID# and group: [id number]  Dx.: [id number]

## 2019-07-09 ENCOUNTER — TELEPHONE (OUTPATIENT)
Dept: RHEUMATOLOGY | Facility: CLINIC | Age: 39
End: 2019-07-09

## 2019-07-09 NOTE — TELEPHONE ENCOUNTER
Fax received at [facility] requesting refill for vitamin D2, take capsule by mouth once a week.

## 2019-07-10 NOTE — TELEPHONE ENCOUNTER
LOV: 4/8/2019  Please advise on refill request.  Future Appointments   Date Time Provider Bibiana Keen   9/23/2019  8:40 AM Aimee Russ MD [facility]     Labs:   Component      Latest Ref Rng & Units 4/8/2019   Vitamin D, 25OH, Total

## 2019-07-18 RX ORDER — ERGOCALCIFEROL (VITAMIN D2) 1250 MCG
50000 CAPSULE ORAL WEEKLY
Qty: 4 CAPSULE | Refills: 2 | Status: SHIPPED | OUTPATIENT
Start: 2019-07-18 | End: 2019-08-17

## 2019-07-22 RX ORDER — ADALIMUMAB 40MG/0.8ML
KIT SUBCUTANEOUS
Qty: 2 EACH | Refills: 6 | Status: SHIPPED | OUTPATIENT
Start: 2019-07-22 | End: 2020-01-27

## 2020-01-27 RX ORDER — ADALIMUMAB 40MG/0.8ML
KIT SUBCUTANEOUS
Qty: 2 EACH | Refills: 3 | Status: SHIPPED | OUTPATIENT
Start: 2020-01-27 | End: 2020-05-18

## 2020-01-27 NOTE — TELEPHONE ENCOUNTER
Called and left a VM to inform him per Dr. Piyush Gibson he is do for F/U. Please call office to help you schedule an appointment.

## 2020-02-26 ENCOUNTER — OFFICE VISIT (OUTPATIENT)
Dept: RHEUMATOLOGY | Facility: CLINIC | Age: 40
End: 2020-02-26
Payer: COMMERCIAL

## 2020-02-26 VITALS
BODY MASS INDEX: 33.32 KG/M2 | HEIGHT: 71 IN | HEART RATE: 70 BPM | SYSTOLIC BLOOD PRESSURE: 125 MMHG | WEIGHT: 238 LBS | DIASTOLIC BLOOD PRESSURE: 79 MMHG | RESPIRATION RATE: 16 BRPM

## 2020-02-26 DIAGNOSIS — M45.9 ANKYLOSING SPONDYLITIS, UNSPECIFIED SITE OF SPINE (HCC): Primary | ICD-10-CM

## 2020-02-26 DIAGNOSIS — E55.9 VITAMIN D DEFICIENCY: ICD-10-CM

## 2020-02-26 DIAGNOSIS — Z51.81 ENCOUNTER FOR THERAPEUTIC DRUG MONITORING: ICD-10-CM

## 2020-02-26 PROCEDURE — 99214 OFFICE O/P EST MOD 30 MIN: CPT | Performed by: INTERNAL MEDICINE

## 2020-02-26 NOTE — PATIENT INSTRUCTIONS
1. Cont.  humira 40mg every other 2 weeks. 2. Return to clinic in  6 -9 months. 3. Check labs in 4.2020  4.  Increase stretching and therapy

## 2020-02-26 NOTE — PROGRESS NOTES
Jennifer Becker is a 46year old male who presents for Patient presents with: Ankylosing Spondylitis  Medication Follow-Up  . HPI:     I had the pleasure of seeing Jennifer Becker on 2/7/2018 for evaluation. He was referred by Dr. Kelvin Guerra.      Over t . After 3 days the hurmia started felt better. His right ihp is much better. He has no pain now. His pain used to be 5/10 pain. It varied by day. He has noticed a minor cold/sinus thing one day after injeciton.    No ibuporfen or aleve since started i • Fexofenadine-Pseudoephed -240 MG Oral Tablet 24 Hr Take by mouth.         Past Medical History:   Diagnosis Date   • Acute iritis of left eye 6/7/2016   • Acute iritis of right eye 01/03/2018   • [facility] 02/28/2018    Diagnosis /79 (BP Location: Left arm, Patient Position: Sitting, Cuff Size: large)   Pulse 70   Resp 16   Ht 5' 11\" (1.803 m)   Wt 238 lb (108 kg)   BMI 33.19 kg/m²   HEENT: Clear oropharynx, no oral ulcers, EOM intact, clear sclear, PERRLA, pleasant, no acut 32.0 - 37.0 g/dl 34.4   RDW      11.0 - 15.0 % 12.7   Platelet Count      284 - 400 K/   MEAN PLATELET VOLUME      7.4 - 10.3 fL 8.7   Quantiferon TB Gold NIL      IU/mL 0.061   Quantiferon TB Gold TB-NIL      IU/mL 0.020   Quantiferon TB Gold MI Quantiferon-TB1 Minus NIL      IU/mL -0.01   Quantiferon-TB2 Minus NIL      IU/mL -0.01   Quantiferon TB Mitogen minus NIL      IU/mL >10.00   QTB. RESULT      Negative Negative   CREATININE      0.70 - 1.30 mg/dL 1.09   eGFR NON-AFR.  AMERICAN      >=60 86 - was told to get arthroscopic sx in 2016 but postponed   - plan to started on 3/1/2018 -  humira 40mg every 2 weeks.      Dw/ him about ongoing stretching and therapy -      2. oa of hips - had xrays with dr. Shawn Aleman and did not get the arthrsocpic sx - in

## 2020-03-19 ENCOUNTER — PATIENT MESSAGE (OUTPATIENT)
Dept: RHEUMATOLOGY | Facility: CLINIC | Age: 40
End: 2020-03-19

## 2020-03-19 NOTE — TELEPHONE ENCOUNTER
From: Benjamin Wang  To: John Nixon MD  Sent: 3/19/2020 7:56 AM CDT  Subject: Non-Urgent Medical Question    Good Morning Dr. Harvey, I hope all is well with you and your family.  I know that HLA B27 positive patients, like myself, have immune benefi

## 2020-04-24 ENCOUNTER — TELEPHONE (OUTPATIENT)
Dept: RHEUMATOLOGY | Facility: CLINIC | Age: 40
End: 2020-04-24

## 2020-04-24 NOTE — TELEPHONE ENCOUNTER
PA started on covermymeds. Your information has been submitted to [employer]. [employer] is reviewing the PA request and you will receive an electronic response.  You may check for the updated outcome later by reopening this request. The standard fax

## 2020-04-27 ENCOUNTER — PATIENT MESSAGE (OUTPATIENT)
Dept: RHEUMATOLOGY | Facility: CLINIC | Age: 40
End: 2020-04-27

## 2020-04-27 RX ORDER — METHYLPREDNISOLONE 4 MG/1
TABLET ORAL
Qty: 1 PACKAGE | Refills: 0 | Status: SHIPPED | OUTPATIENT
Start: 2020-04-27 | End: 2021-04-09 | Stop reason: ALTCHOICE

## 2020-04-27 NOTE — TELEPHONE ENCOUNTER
Please see pt's message below and advise. Follow up to discuss? ?      Last office visit 2/26/2020:    ASSESSMENT AND PLAN:   Lamont Mejias is a 46year old male who presents for Patient presents with: Ankylosing Spondylitis  Medication Follow-Up     1.

## 2020-04-27 NOTE — TELEPHONE ENCOUNTER
From: Gricelda Wang  To: Ellis Velasco MD  Sent: 4/27/2020 9:19 AM CDT  Subject: Non-Urgent Radha Mckeon,     The last 3 Humira injections I've taken don't seem to be providing me pain relief and causing new flare-ups I've never de

## 2020-04-27 NOTE — TELEPHONE ENCOUNTER
Messaged pt. That likely we need a visit to discuss new medication.  Also sent in a medrol navdeep  - can you help him schedule

## 2020-04-29 ENCOUNTER — TELEMEDICINE (OUTPATIENT)
Dept: RHEUMATOLOGY | Facility: CLINIC | Age: 40
End: 2020-04-29

## 2020-04-29 DIAGNOSIS — R21 RASH: ICD-10-CM

## 2020-04-29 DIAGNOSIS — M45.9 ANKYLOSING SPONDYLITIS, UNSPECIFIED SITE OF SPINE (HCC): Primary | ICD-10-CM

## 2020-04-29 DIAGNOSIS — Z51.81 ENCOUNTER FOR THERAPEUTIC DRUG MONITORING: ICD-10-CM

## 2020-04-29 PROCEDURE — 99214 OFFICE O/P EST MOD 30 MIN: CPT | Performed by: INTERNAL MEDICINE

## 2020-04-29 NOTE — PATIENT INSTRUCTIONS
1. Cont.  humira 40mg every other 2 weeks. 2. If pain increases , then can switch to Enbrel  3. Topical cortisone cream for rash   4. Follow up in 3-6 months if doing ok.

## 2020-04-29 NOTE — PROGRESS NOTES
Gely Lazo is a 45year old male who presents for No chief complaint on file. . This visit is conducted using Telemedicine with live, interactive video and audio.     Patient understands and accepts financial responsibility for any deductible, co-ins 2/26/2020  He's been doing good. He has no infections. He feels health on the humira 40mg every other week. He feels at times his skin gets scaly. His hip is doing good. 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 Quality of life is better.  - can t Encounters:  02/26/20 : 238 lb (108 kg)  04/08/19 : 233 lb (105.7 kg)    There is no height or weight on file to calculate BMI.       Current Outpatient Medications   Medication Sig Dispense Refill   • methylPREDNISolone 4 MG Oral Tablet Therapy Pack Take a temple pain  Eyes: No visual changes,   CVS: No chest pain, no heart disease  RS: No SOB, no Cough, No Pleurtic pain,   GI: No nausea, no vomiiting, no abominal pain, no hx of ulcer, no gastritis, no heartburn, no dyshpagia, no BRBPR or melena  When he eat 100.0 fL 86.2   MCH      27.0 - 32.0 pg 29.7   MCHC      32.0 - 37.0 g/dl 34.4   RDW      11.0 - 15.0 % 12.7   Platelet Count      682 - 400 K/   MEAN PLATELET VOLUME      7.4 - 10.3 fL 8.7   Quantiferon TB Gold NIL      IU/mL 0.061   Quantiferon TB 0.6   Quantiferon TB NIL      IU/mL 0.04   Quantiferon-TB1 Minus NIL      IU/mL -0.01   Quantiferon-TB2 Minus NIL      IU/mL -0.01   Quantiferon TB Mitogen minus NIL      IU/mL >10.00   QTB. RESULT      Negative Negative   CREATININE      0.70 - 1.30 mg/dL and benefits of the medications, including TB risk, malignancy risk and infection risk. - cont. humira 40mg every 2 weeks for now.      - ongoing hip osteoarthriits - that's gradually worsening - looking at hip replacments with dr. Chani Ludwig at [facility]

## 2020-05-16 ENCOUNTER — PATIENT MESSAGE (OUTPATIENT)
Dept: RHEUMATOLOGY | Facility: CLINIC | Age: 40
End: 2020-05-16

## 2020-05-18 ENCOUNTER — TELEPHONE (OUTPATIENT)
Dept: RHEUMATOLOGY | Facility: CLINIC | Age: 40
End: 2020-05-18

## 2020-05-18 NOTE — TELEPHONE ENCOUNTER
From: Ilda Wang  To: Lexi Garsia MD  Sent: 5/16/2020 11:13 AM CDT  Subject: Non-Urgent Mary Martinez,    After taking my last 2 Humira Injections, including one yesterday (Friday May 15th), I continue to experience flare-ups.

## 2020-05-18 NOTE — TELEPHONE ENCOUNTER
Ok to switch to Enbrel now- will send in to pharmacy - ok to start PA  No need to do another visit - just need to schedule a visit in  1 month  Of starting

## 2020-05-18 NOTE — TELEPHONE ENCOUNTER
Please see below and picture above. Please advise. Tasked to Dr. Saul Sharma, on call provider and Dr. Eladia Messer. ASSESSMENT AND PLAN:   Xavier Irving is a 45year old male who presents for No chief complaint on file.     1.  Ankylosing sponydlitis with s

## 2020-05-19 RX ORDER — ADALIMUMAB 40MG/0.8ML
KIT SUBCUTANEOUS
Qty: 2 EACH | Refills: 0 | OUTPATIENT
Start: 2020-05-19

## 2020-05-20 ENCOUNTER — PATIENT MESSAGE (OUTPATIENT)
Dept: OPHTHALMOLOGY | Facility: CLINIC | Age: 40
End: 2020-05-20

## 2020-05-20 NOTE — TELEPHONE ENCOUNTER
Yes ok to injection on 5/22 - but he should let us know or ophthalmologist know if his iritis gets worse - b/c Enbrel doesn't help iritis as much as humira.    He may need to get drops from his eye doctor

## 2020-05-20 NOTE — TELEPHONE ENCOUNTER
Spoke with pt and informed of provider communication below. Pt verbalized understanding and is agreeable with plan.

## 2020-05-20 NOTE — TELEPHONE ENCOUNTER
Enbrel PA approved through 5/18/2021. Case # [id number]. Script has been sent to [facility] pharmacy by provider on 5/18. Pt is aware to sign up for co-pay card. Dr. Mat Devlin - pt declined in-office injection teaching.  His last Humira injection was 5/

## 2020-05-21 ENCOUNTER — OFFICE VISIT (OUTPATIENT)
Dept: OPHTHALMOLOGY | Facility: CLINIC | Age: 40
End: 2020-05-21
Payer: COMMERCIAL

## 2020-05-21 DIAGNOSIS — H20.00 ACUTE IRITIS OF LEFT EYE: Primary | ICD-10-CM

## 2020-05-21 PROCEDURE — 99213 OFFICE O/P EST LOW 20 MIN: CPT | Performed by: OPHTHALMOLOGY

## 2020-05-21 RX ORDER — ADALIMUMAB 40MG/0.8ML
KIT SUBCUTANEOUS
Qty: 2 EACH | Refills: 0 | OUTPATIENT
Start: 2020-05-21

## 2020-05-21 RX ORDER — PREDNISOLONE ACETATE 10 MG/ML
SUSPENSION/ DROPS OPHTHALMIC
Qty: 15 ML | Refills: 1 | Status: SHIPPED | OUTPATIENT
Start: 2020-05-21 | End: 2020-05-28

## 2020-05-21 NOTE — PATIENT INSTRUCTIONS
Acute iritis of left eye  Diagnosis discussed with patient. Start Pred Forte in the left eye every 2 hours when awake.     Will see patient in 1 week

## 2020-05-21 NOTE — TELEPHONE ENCOUNTER
From: Néstor Wang  To: Jovanny Herrera MD  Sent: 5/20/2020 8:06 PM CDT  Subject: Non-Urgent Geovanni Bennett,    I hope all is well. I have Iritis in my left eye.  It started mild on Friday night (May 15th) and stayed mild until tod

## 2020-05-21 NOTE — PROGRESS NOTES
Lamont Mejias is a 45year old male. HPI:     HPI     Pt is here for possible iritis OS. Pt complains of pain (5/10), redness, blurry vision and light sensitivity OS x 5 days, started on 5/15/2020, has not gotten any better.      Last edited by [address] Dist cc 20/20 20/25 -2    Correction:  Glasses          Pupils       Pupils    Right PERRL    Left PERRL          Dilation     Left eye:  2.0% Cyclogyl @ 9:51 AM            Slit Lamp and Fundus Exam     Slit Lamp Exam       Right Left    Lids/Lashes Meibom

## 2020-05-21 NOTE — ASSESSMENT & PLAN NOTE
Diagnosis discussed with patient. Start Pred Forte in the left eye every 2 hours when awake.     Will see patient in 1 week

## 2020-05-28 ENCOUNTER — OFFICE VISIT (OUTPATIENT)
Dept: OPHTHALMOLOGY | Facility: CLINIC | Age: 40
End: 2020-05-28
Payer: COMMERCIAL

## 2020-05-28 DIAGNOSIS — H20.00 ACUTE IRITIS OF LEFT EYE: Primary | ICD-10-CM

## 2020-05-28 PROCEDURE — 99213 OFFICE O/P EST LOW 20 MIN: CPT | Performed by: OPHTHALMOLOGY

## 2020-05-28 RX ORDER — PREDNISOLONE ACETATE 10 MG/ML
SUSPENSION/ DROPS OPHTHALMIC
Qty: 15 ML | Refills: 1 | Status: SHIPPED | OUTPATIENT
Start: 2020-05-28 | End: 2020-06-28

## 2020-05-28 NOTE — ASSESSMENT & PLAN NOTE
Some improvement. Advised patient to continue Pred Forte 1 drop in the left eye every 2 hours while awake for 1 week, then 4 times a day.

## 2020-05-28 NOTE — PATIENT INSTRUCTIONS
Acute iritis of left eye  Some improvement. Advised patient to continue Pred Forte 1 drop in the left eye every 2 hours while awake for 1 week, then 4 times a day.

## 2020-05-28 NOTE — PROGRESS NOTES
Jessica Villatoro is a 45year old male. HPI:     HPI     Pt is here for a recheck iritis OS.  Pt is taking Pred Forte OS every 2 hours while awake, last used this morning around 7:30am. Pt states he is having some improvement but has not fully gone away, Naproxen Sodium (ALEVE) 220 MG Oral Tab Take by mouth as needed. • Fexofenadine-Pseudoephed -240 MG Oral Tablet 24 Hr Take by mouth.          Allergies:  No Known Allergies    ROS:       PHYSICAL EXAM:     Base Eye Exam     Visual Acuity (Snellen

## 2020-06-11 ENCOUNTER — OFFICE VISIT (OUTPATIENT)
Dept: OPHTHALMOLOGY | Facility: CLINIC | Age: 40
End: 2020-06-11
Payer: COMMERCIAL

## 2020-06-11 DIAGNOSIS — H20.00 ACUTE IRITIS OF LEFT EYE: Primary | ICD-10-CM

## 2020-06-11 PROCEDURE — 99213 OFFICE O/P EST LOW 20 MIN: CPT | Performed by: OPHTHALMOLOGY

## 2020-06-11 NOTE — PATIENT INSTRUCTIONS
Acute iritis of left eye  Some improvement left eye. Continue Pred Forte in the left eye 4 times a day.       Will see patient back in 3 weeks for a recheck

## 2020-06-11 NOTE — PROGRESS NOTES
Armando Betts is a 45year old male. HPI:     HPI     Patient is here for a recheck of iritis OS. He is taking Pred Forte OS QID as directed. Patient states his left eye is improved. He denies pain or redness.   He still complains of blurry vision Allergies:  No Known Allergies    ROS:       PHYSICAL EXAM:     Base Eye Exam     Visual Acuity (Snellen - Linear)       Right Left    Dist cc 20/20 20/25    Correction:  Glasses          Pupils       Pupils    Right PERRL    Left PERRL            Sl

## 2020-06-11 NOTE — ASSESSMENT & PLAN NOTE
Some improvement left eye. Continue Pred Forte in the left eye 4 times a day.       Will see patient back in 3 weeks for a recheck

## 2020-07-09 ENCOUNTER — OFFICE VISIT (OUTPATIENT)
Dept: OPHTHALMOLOGY | Facility: CLINIC | Age: 40
End: 2020-07-09
Payer: COMMERCIAL

## 2020-07-09 DIAGNOSIS — H20.00 ACUTE IRITIS OF LEFT EYE: Primary | ICD-10-CM

## 2020-07-09 PROCEDURE — 99213 OFFICE O/P EST LOW 20 MIN: CPT | Performed by: OPHTHALMOLOGY

## 2020-07-09 NOTE — PROGRESS NOTES
Madi Nugent is a 45year old male. HPI:     HPI     Patient is here for a recheck of iritis OS. He was taking Pred Forte OS QID but stopped 3 days ago because his left eye was feeling better and he wanted to try his CL.   Patient states his left ey Right Left    Pressure 16 16          Pupils       Pupils    Right PERRL    Left PERRL            Slit Lamp and Fundus Exam     Slit Lamp Exam       Right Left    Lids/Lashes Meibomian gland dysfunction Meibomian gland dysfunction    Conjunctiva/Sclera Non

## 2020-07-09 NOTE — PATIENT INSTRUCTIONS
Acute iritis of left eye  Use Pred Forte in the left eye 2x a day x 1 week( morning and night)  then 1x a day for 1 week and stop drops after 2 weeks. OK to resume contacts.

## 2020-07-09 NOTE — ASSESSMENT & PLAN NOTE
Use Pred Forte in the left eye 2x a day x 1 week( morning and night)  then 1x a day for 1 week and stop drops after 2 weeks. OK to resume contacts.

## 2020-10-28 RX ORDER — ETANERCEPT 50 MG/ML
SOLUTION SUBCUTANEOUS
Qty: 4 ML | Refills: 5 | Status: SHIPPED | OUTPATIENT
Start: 2020-10-28 | End: 2020-11-20

## 2020-10-28 NOTE — TELEPHONE ENCOUNTER
Last filled: 5/18/2020 #4 mL with 5 refills   LOV: 4/29/2020  Future Appointments   Date Time Provider Bibiana Keen   11/28/2020  9:00 AM Ricardo Arredondo MD [facility]     Labs: 4/8/19    Summary:  1.   Cont.  humira 40mg every other 2 week

## 2020-11-20 RX ORDER — ADALIMUMAB 40MG/0.8ML
1 KIT SUBCUTANEOUS
Qty: 2 EACH | Refills: 5 | Status: SHIPPED | OUTPATIENT
Start: 2020-11-20 | End: 2021-04-09 | Stop reason: ALTCHOICE

## 2020-11-24 ENCOUNTER — PATIENT MESSAGE (OUTPATIENT)
Dept: RHEUMATOLOGY | Facility: CLINIC | Age: 40
End: 2020-11-24

## 2021-04-01 ENCOUNTER — PATIENT MESSAGE (OUTPATIENT)
Dept: RHEUMATOLOGY | Facility: CLINIC | Age: 41
End: 2021-04-01

## 2021-04-02 NOTE — TELEPHONE ENCOUNTER
From: Winnie Sicard Blanchett  To: Henri Weston MD  Sent: 4/1/2021 4:30 PM CDT  Subject: Non-Urgent Handy Muse,    I hope all is well.  I was able to schedule my COVID Vaccine for Monday April 5th at the [facility] now that

## 2021-04-09 ENCOUNTER — OFFICE VISIT (OUTPATIENT)
Dept: RHEUMATOLOGY | Facility: CLINIC | Age: 41
End: 2021-04-09
Payer: COMMERCIAL

## 2021-04-09 ENCOUNTER — LAB ENCOUNTER (OUTPATIENT)
Dept: LAB | Facility: HOSPITAL | Age: 41
End: 2021-04-09
Attending: INTERNAL MEDICINE
Payer: COMMERCIAL

## 2021-04-09 VITALS
RESPIRATION RATE: 16 BRPM | BODY MASS INDEX: 32.9 KG/M2 | DIASTOLIC BLOOD PRESSURE: 78 MMHG | HEIGHT: 71 IN | HEART RATE: 76 BPM | WEIGHT: 235 LBS | SYSTOLIC BLOOD PRESSURE: 121 MMHG

## 2021-04-09 DIAGNOSIS — M45.9 ANKYLOSING SPONDYLITIS, UNSPECIFIED SITE OF SPINE (HCC): ICD-10-CM

## 2021-04-09 DIAGNOSIS — Z51.81 THERAPEUTIC DRUG MONITORING: ICD-10-CM

## 2021-04-09 DIAGNOSIS — M45.9 ANKYLOSING SPONDYLITIS, UNSPECIFIED SITE OF SPINE (HCC): Primary | ICD-10-CM

## 2021-04-09 PROCEDURE — 86140 C-REACTIVE PROTEIN: CPT

## 2021-04-09 PROCEDURE — 80053 COMPREHEN METABOLIC PANEL: CPT

## 2021-04-09 PROCEDURE — 85652 RBC SED RATE AUTOMATED: CPT

## 2021-04-09 PROCEDURE — 3008F BODY MASS INDEX DOCD: CPT | Performed by: INTERNAL MEDICINE

## 2021-04-09 PROCEDURE — 3078F DIAST BP <80 MM HG: CPT | Performed by: INTERNAL MEDICINE

## 2021-04-09 PROCEDURE — 85027 COMPLETE CBC AUTOMATED: CPT

## 2021-04-09 PROCEDURE — 86480 TB TEST CELL IMMUN MEASURE: CPT

## 2021-04-09 PROCEDURE — 36415 COLL VENOUS BLD VENIPUNCTURE: CPT

## 2021-04-09 PROCEDURE — 3074F SYST BP LT 130 MM HG: CPT | Performed by: INTERNAL MEDICINE

## 2021-04-09 PROCEDURE — 99214 OFFICE O/P EST MOD 30 MIN: CPT | Performed by: INTERNAL MEDICINE

## 2021-04-09 RX ORDER — ETANERCEPT 50 MG/ML
SOLUTION SUBCUTANEOUS
COMMUNITY
Start: 2021-03-19 | End: 2021-04-12

## 2021-04-09 NOTE — PROGRESS NOTES
Angela Hinton is a 37year old male who presents for Patient presents with: Ankylosing Spondylitis  Back Pain  Medication Follow-Up    HPI:     I had the pleasure of seeing Angela Hinton on 2/7/2018 for evaluation. He was referred by Dr. Jp Olguin. good.   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 Quality of life is better. - can tie his shoe now.     4/29/2020  VIDEO VISIT  He messaged on 4/24/2020 - having a flare. Sent in a medrol navdeep.  The last 3 humira shots have not seemed to alleviate the flare enbrel in 5/2021. At the same time he had right eye iritis flare in 5/2020 - and then got better in 6/2020. He took the steorid drops and it got better. At that time he had rib pain and heel pain   All of it got better.      Wt Readings from Last 2 [place] changes,   CVS: No chest pain, no heart disease  RS: No SOB, no Cough, No Pleurtic pain,   GI: No nausea, no vomiiting, no abominal pain, no hx of ulcer, no gastritis, no heartburn, no dyshpagia, no BRBPR or melena  When he eats breads or sandwhiches - he % 46.0   MCV      80.0 - 100.0 fL 86.2   MCH      27.0 - 32.0 pg 29.7   MCHC      32.0 - 37.0 g/dl 34.4   RDW      11.0 - 15.0 % 12.7   Platelet Count      731 - 400 K/   MEAN PLATELET VOLUME      7.4 - 10.3 fL 8.7   Quantiferon TB Gold NIL      IU/m Immature Granulocyte %      % 0.6   Quantiferon TB NIL      IU/mL 0.04   Quantiferon-TB1 Minus NIL      IU/mL -0.01   Quantiferon-TB2 Minus NIL      IU/mL -0.01   Quantiferon TB Mitogen minus NIL      IU/mL >10.00   QTB. RESULT      Negative Negative   CR Cont. enbrel 50mg a week 5/2020 -     Stopped . humira 40mg every 2 weeks for now.  In 5/2020     - ongoing hip osteoarthriits - that's gradually worsening - [facility] [place] - no need at this time -   - was told

## 2021-04-09 NOTE — PATIENT INSTRUCTIONS
1. Cont. Enbrel 50mg every week   2. Return to clinic in 6-12 months. 3. Topical cortisone cream for rash  4.  Check labs

## 2021-04-12 RX ORDER — ETANERCEPT 50 MG/ML
50 SOLUTION SUBCUTANEOUS WEEKLY
COMMUNITY
Start: 2020-05-01 | End: 2021-04-12

## 2021-04-12 RX ORDER — ETANERCEPT 50 MG/ML
50 SOLUTION SUBCUTANEOUS WEEKLY
Qty: 4 ML | Refills: 5 | Status: SHIPPED | OUTPATIENT
Start: 2021-04-12 | End: 2021-10-14

## 2021-10-12 ENCOUNTER — PATIENT MESSAGE (OUTPATIENT)
Dept: RHEUMATOLOGY | Facility: CLINIC | Age: 41
End: 2021-10-12

## 2021-10-13 NOTE — TELEPHONE ENCOUNTER
LOV: 4/9/21  No future appointments.    LABS:  Component      Latest Ref Rng & Units 4/9/2021   Glucose      70 - 99 mg/dL 80   Sodium      136 - 145 mmol/L 139   Potassium      3.5 - 5.1 mmol/L 3.9   Chloride      98 - 112 mmol/L 107   Carbon Dioxide, Tota

## 2021-10-13 NOTE — TELEPHONE ENCOUNTER
From: Val Wang  To: Wallace Dickens MD  Sent: 10/12/2021 10:02 AM CDT  Subject: Joe Turner,     I hope you're doing well. I did not get the automated call from [facility] to refill my order last week. I am due to take a pen this week.

## 2021-10-14 RX ORDER — ETANERCEPT 50 MG/ML
50 SOLUTION SUBCUTANEOUS WEEKLY
Qty: 4 ML | Refills: 5 | Status: SHIPPED | OUTPATIENT
Start: 2021-10-14 | End: 2021-10-18

## 2021-10-16 ENCOUNTER — TELEPHONE (OUTPATIENT)
Dept: RHEUMATOLOGY | Facility: CLINIC | Age: 41
End: 2021-10-16

## 2021-10-16 NOTE — TELEPHONE ENCOUNTER
Patient is requesting a refill and needs it sent to [facility] mail order pharmacy (on file). He is out of the medication and wanted to know if the office was able to provide any short term supply asap.  He is requesting a call back to discuss    Etanercept (

## 2021-10-18 ENCOUNTER — TELEPHONE (OUTPATIENT)
Dept: RHEUMATOLOGY | Facility: CLINIC | Age: 41
End: 2021-10-18

## 2021-10-18 RX ORDER — ETANERCEPT 50 MG/ML
50 SOLUTION SUBCUTANEOUS WEEKLY
Qty: 4 ML | Refills: 5 | Status: SHIPPED | OUTPATIENT
Start: 2021-10-18

## 2021-10-18 NOTE — TELEPHONE ENCOUNTER
[facility] is requesting to have the prescription transferred. Please advise.      Fax [phone]

## 2021-10-18 NOTE — TELEPHONE ENCOUNTER
PA approved for Enbrel 4 autoinjectors per 28 days from 10/18/2021 - 10/18/2022.     Case #: [id number]

## 2021-12-07 ENCOUNTER — OFFICE VISIT (OUTPATIENT)
Dept: RHEUMATOLOGY | Facility: CLINIC | Age: 41
End: 2021-12-07
Payer: COMMERCIAL

## 2021-12-07 VITALS
SYSTOLIC BLOOD PRESSURE: 110 MMHG | HEIGHT: 71 IN | WEIGHT: 240.63 LBS | DIASTOLIC BLOOD PRESSURE: 75 MMHG | HEART RATE: 69 BPM | RESPIRATION RATE: 16 BRPM | BODY MASS INDEX: 33.69 KG/M2

## 2021-12-07 DIAGNOSIS — M45.9 ANKYLOSING SPONDYLITIS, UNSPECIFIED SITE OF SPINE (HCC): Primary | ICD-10-CM

## 2021-12-07 DIAGNOSIS — H20.9 IRITIS: ICD-10-CM

## 2021-12-07 DIAGNOSIS — E55.9 VITAMIN D DEFICIENCY: ICD-10-CM

## 2021-12-07 DIAGNOSIS — Z51.81 THERAPEUTIC DRUG MONITORING: ICD-10-CM

## 2021-12-07 PROCEDURE — 3074F SYST BP LT 130 MM HG: CPT | Performed by: INTERNAL MEDICINE

## 2021-12-07 PROCEDURE — 99214 OFFICE O/P EST MOD 30 MIN: CPT | Performed by: INTERNAL MEDICINE

## 2021-12-07 PROCEDURE — 3078F DIAST BP <80 MM HG: CPT | Performed by: INTERNAL MEDICINE

## 2021-12-07 PROCEDURE — 3008F BODY MASS INDEX DOCD: CPT | Performed by: INTERNAL MEDICINE

## 2021-12-07 RX ORDER — IBUPROFEN 800 MG/1
800 TABLET ORAL EVERY 8 HOURS PRN
Qty: 90 TABLET | Refills: 1 | Status: SHIPPED | OUTPATIENT
Start: 2021-12-07

## 2021-12-07 RX ORDER — METHYLPREDNISOLONE 4 MG/1
TABLET ORAL
Qty: 1 EACH | Refills: 0 | Status: SHIPPED | OUTPATIENT
Start: 2021-12-07

## 2021-12-07 NOTE — PROGRESS NOTES
Vladimir Young is a 37year old male who presents for Patient presents with: Ankylosing Spondylitis  Eye Problem: pain  Dental Problem  Back Pain: and buttocks     HPI:     I had the pleasure of seeing Vladimir Young on 2/7/2018 for evaluation.  He was feels health on the humira 40mg every other week. He feels at times his skin gets scaly. His hip is doing good. 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 Quality of life is better.  - can tie his shoe now.     4/29/2020  VIDEO VISIT  He messaged on 4/ playing hockey and he stopped the kids hocky for this year. He got the covid vaccine on 4/5/2021  He switched to enbrel in 5/2021. At the same time he had right eye iritis flare in 5/2020 - and then got better in 6/2020.  He took the steorid drops and i Paternal Grandfather    • Heart Disorder Paternal Grandfather    • Other (Other) Mother         Ankylosing spondylitis   • Diabetes Neg    • Macular degeneration Neg    he notes the AS in his mom is in her neck.  Has 2 younger brothers and a sister     Soci Sodium      136 - 144 mmol/L 140   Potassium      3.3 - 5.1 mmol/L 4.0   Chloride      95 - 110 mmol/L 104   Carbon Dioxide, Total      22 - 32 mmol/L 29   BUN      8 - 20 mg/dL 14   CREATININE      0.50 - 1.50 mg/dL 0.99   CALCIUM      8.5 - 10.5 mg/dL Latest Ref Rng & Units 4/8/2019   WBC      4.0 - 11.0 x10(3) uL 8.1   RBC      4.30 - 5.70 x10(6)uL 5.27   Hemoglobin      13.0 - 17.5 g/dL 17.2   Hematocrit      39.0 - 53.0 % 48.0   MCV      80.0 - 100.0 fL 91.1   MCH      26.0 - 34.0 pg 32.6   MCHC weightbearing area of the acetabulum deep to an area of full-thickness articular cartilage loss.   FINAL REPORT  THE ATTENDING RADIOLOGIST INTERPRETED THIS STUDY WITH THE RESIDENT  WHOSE NAME APPEARS BELOW, AND FULLY AGREES     8/31/2016 - ct lower extremit Enbrel 50mg every week   2. Medrol navdeep for iritis -   3. Ibuprofen 800mg every 8 hours as needed   4. Return to clinic in 6-12 months. 5. Topical cortisone cream for rash  6.  Check labs in 4/2022 Tushar Mac MD  12/7/2021   5:13 PM  - Re

## 2021-12-07 NOTE — PATIENT INSTRUCTIONS
Summary:  1. Cont. Enbrel 50mg every week   2. Medrol navdeep for iritis -   3. Ibuprofen 800mg every 8 hours as needed   4. Return to clinic in 6-12 months. 5. Topical cortisone cream for rash  6.  Check labs in 4/2022 -

## 2022-03-17 ENCOUNTER — TELEPHONE (OUTPATIENT)
Dept: RHEUMATOLOGY | Facility: CLINIC | Age: 42
End: 2022-03-17

## 2022-03-17 RX ORDER — ETANERCEPT 50 MG/ML
SOLUTION SUBCUTANEOUS
Qty: 4 ML | Refills: 5 | Status: SHIPPED | OUTPATIENT
Start: 2022-03-17

## 2022-05-10 ENCOUNTER — LAB ENCOUNTER (OUTPATIENT)
Dept: LAB | Facility: HOSPITAL | Age: 42
End: 2022-05-10
Attending: INTERNAL MEDICINE
Payer: COMMERCIAL

## 2022-05-10 DIAGNOSIS — M45.9 ANKYLOSING SPONDYLITIS, UNSPECIFIED SITE OF SPINE (HCC): ICD-10-CM

## 2022-05-10 DIAGNOSIS — Z51.81 THERAPEUTIC DRUG MONITORING: ICD-10-CM

## 2022-05-10 DIAGNOSIS — E55.9 VITAMIN D DEFICIENCY: ICD-10-CM

## 2022-05-10 LAB
ALBUMIN SERPL-MCNC: 3.5 G/DL (ref 3.4–5)
ALBUMIN/GLOB SERPL: 0.9 {RATIO} (ref 1–2)
ALP LIVER SERPL-CCNC: 85 U/L
ALT SERPL-CCNC: 42 U/L
ANION GAP SERPL CALC-SCNC: 4 MMOL/L (ref 0–18)
AST SERPL-CCNC: 25 U/L (ref 15–37)
BILIRUB SERPL-MCNC: 0.4 MG/DL (ref 0.1–2)
BUN BLD-MCNC: 16 MG/DL (ref 7–18)
BUN/CREAT SERPL: 13.9 (ref 10–20)
CALCIUM BLD-MCNC: 8.8 MG/DL (ref 8.5–10.1)
CHLORIDE SERPL-SCNC: 107 MMOL/L (ref 98–112)
CO2 SERPL-SCNC: 29 MMOL/L (ref 21–32)
CREAT BLD-MCNC: 1.15 MG/DL
CRP SERPL-MCNC: <0.29 MG/DL (ref ?–0.3)
DEPRECATED RDW RBC AUTO: 38.5 FL (ref 35.1–46.3)
ERYTHROCYTE [DISTWIDTH] IN BLOOD BY AUTOMATED COUNT: 12 % (ref 11–15)
ERYTHROCYTE [SEDIMENTATION RATE] IN BLOOD: 10 MM/HR
FASTING STATUS PATIENT QL REPORTED: NO
GLOBULIN PLAS-MCNC: 3.7 G/DL (ref 2.8–4.4)
GLUCOSE BLD-MCNC: 102 MG/DL (ref 70–99)
HCT VFR BLD AUTO: 44.4 %
HGB BLD-MCNC: 15.1 G/DL
MCH RBC QN AUTO: 29.8 PG (ref 26–34)
MCHC RBC AUTO-ENTMCNC: 34 G/DL (ref 31–37)
MCV RBC AUTO: 87.6 FL
OSMOLALITY SERPL CALC.SUM OF ELEC: 291 MOSM/KG (ref 275–295)
PLATELET # BLD AUTO: 232 10(3)UL (ref 150–450)
POTASSIUM SERPL-SCNC: 4.1 MMOL/L (ref 3.5–5.1)
PROT SERPL-MCNC: 7.2 G/DL (ref 6.4–8.2)
RBC # BLD AUTO: 5.07 X10(6)UL
SODIUM SERPL-SCNC: 140 MMOL/L (ref 136–145)
VIT D+METAB SERPL-MCNC: 22.5 NG/ML (ref 30–100)
WBC # BLD AUTO: 6.8 X10(3) UL (ref 4–11)

## 2022-05-10 PROCEDURE — 80053 COMPREHEN METABOLIC PANEL: CPT

## 2022-05-10 PROCEDURE — 85652 RBC SED RATE AUTOMATED: CPT

## 2022-05-10 PROCEDURE — 85027 COMPLETE CBC AUTOMATED: CPT

## 2022-05-10 PROCEDURE — 86480 TB TEST CELL IMMUN MEASURE: CPT

## 2022-05-10 PROCEDURE — 36415 COLL VENOUS BLD VENIPUNCTURE: CPT

## 2022-05-10 PROCEDURE — 82306 VITAMIN D 25 HYDROXY: CPT

## 2022-05-10 PROCEDURE — 86140 C-REACTIVE PROTEIN: CPT

## 2022-05-12 LAB
M TB IFN-G CD4+ T-CELLS BLD-ACNC: 0.04 IU/ML
M TB TUBERC IFN-G BLD QL: NEGATIVE
M TB TUBERC IGNF/MITOGEN IGNF CONTROL: >10 IU/ML
QFT TB1 AG MINUS NIL: 0 IU/ML
QFT TB2 AG MINUS NIL: -0.01 IU/ML

## 2022-08-24 RX ORDER — ETANERCEPT 50 MG/ML
SOLUTION SUBCUTANEOUS
Qty: 4 ML | Refills: 5 | Status: SHIPPED | OUTPATIENT
Start: 2022-08-24

## 2022-08-24 NOTE — TELEPHONE ENCOUNTER
Last filled: 3/17/22 #4mL with 5 refills   LOV: 12/17/21  No future appointments. Labs:   Component      Latest Ref Rng & Units 5/10/2022   Glucose      70 - 99 mg/dL 102 (H)   Sodium      136 - 145 mmol/L 140   Potassium      3.5 - 5.1 mmol/L 4.1   Chloride      98 - 112 mmol/L 107   Carbon Dioxide, Total      21.0 - 32.0 mmol/L 29.0   ANION GAP      0 - 18 mmol/L 4   BUN      7 - 18 mg/dL 16   CREATININE      0.70 - 1.30 mg/dL 1.15   BUN/CREATININE RATIO      10.0 - 20.0 13.9   CALCIUM      8.5 - 10.1 mg/dL 8.8   CALCULATED OSMOLALITY      275 - 295 mOsm/kg 291   eGFR NON-AFR. AMERICAN      >=60 79   eGFR AFRICAN AMERICAN      >=60 91   ALT (SGPT)      16 - 61 U/L 42   AST (SGOT)      15 - 37 U/L 25   ALKALINE PHOSPHATASE      45 - 117 U/L 85   Total Bilirubin      0.1 - 2.0 mg/dL 0.4   PROTEIN, TOTAL      6.4 - 8.2 g/dL 7.2   Albumin      3.4 - 5.0 g/dL 3.5   Globulin      2.8 - 4.4 g/dL 3.7   A/G Ratio      1.0 - 2.0 0.9 (L)   Patient Fasting for CMP? No   WBC      4.0 - 11.0 x10(3) uL 6.8   RBC      4.30 - 5.70 x10(6)uL 5.07   Hemoglobin      13.0 - 17.5 g/dL 15.1   Hematocrit      39.0 - 53.0 % 44.4   MCV      80.0 - 100.0 fL 87.6   MCH      26.0 - 34.0 pg 29.8   MCHC      31.0 - 37.0 g/dL 34.0   RDW      11.0 - 15.0 % 12.0   RDW-SD      35.1 - 46.3 fL 38.5   Platelet Count      975.7 - 450.0 10(3)uL 232.0   Quantiferon TB NIL      IU/mL 0.04   Quantiferon-TB1 Minus NIL      IU/mL 0.00   Quantiferon-TB2 Minus NIL      IU/mL -0.01   Quantiferon TB Mitogen minus NIL      IU/mL >10.00   QTB. RESULT      Negative Negative   C-REACTIVE PROTEIN      <0.30 mg/dL <0.29   SED RATE      0 - 15 mm/Hr 10   VITAMIN D, 25-OH, TOTAL      30.0 - 100.0 ng/mL 22.5 (L)

## 2022-11-02 ENCOUNTER — TELEPHONE (OUTPATIENT)
Dept: OPHTHALMOLOGY | Facility: CLINIC | Age: 42
End: 2022-11-02

## 2022-11-02 ENCOUNTER — OFFICE VISIT (OUTPATIENT)
Dept: OPHTHALMOLOGY | Facility: CLINIC | Age: 42
End: 2022-11-02
Payer: COMMERCIAL

## 2022-11-02 DIAGNOSIS — H20.00 ACUTE IRITIS OF LEFT EYE: Primary | ICD-10-CM

## 2022-11-02 PROCEDURE — 99213 OFFICE O/P EST LOW 20 MIN: CPT | Performed by: OPHTHALMOLOGY

## 2022-11-02 RX ORDER — PREDNISOLONE ACETATE 10 MG/ML
SUSPENSION/ DROPS OPHTHALMIC
Qty: 5 ML | Refills: 1 | Status: SHIPPED | OUTPATIENT
Start: 2022-11-02

## 2022-11-02 NOTE — ASSESSMENT & PLAN NOTE
Discussed with patient that iritis can reoccur. Told patient to watch for symptoms of pain, redness, light sensitivity and blurry vision.   Discussed if these symptoms are present, patient should call the office and schedule an appointment      pred forte QID for 1 week, 3 times a day for 1 week, 2 times a day for 1 week, once a day for 1 week then discontinue

## 2022-11-02 NOTE — PATIENT INSTRUCTIONS
Acute iritis of left eye  Discussed with patient that iritis can reoccur. Told patient to watch for symptoms of pain, redness, light sensitivity and blurry vision.   Discussed if these symptoms are present, patient should call the office and schedule an appointment      pred forte QID for 1 week, 3 times a day for 1 week, 2 times a day for 1 week, once a day for 1 week then discontinue

## 2022-11-02 NOTE — TELEPHONE ENCOUNTER
Spoke with patient. He says that he has redness and dull pain in the left eye for the past 24 hours. He has had episodes of iritis in the past.  Appointment booked today at 1:45 with BERTO.

## 2023-01-25 ENCOUNTER — TELEPHONE (OUTPATIENT)
Dept: RHEUMATOLOGY | Facility: CLINIC | Age: 43
End: 2023-01-25

## 2023-04-17 ENCOUNTER — LAB ENCOUNTER (OUTPATIENT)
Dept: LAB | Facility: HOSPITAL | Age: 43
End: 2023-04-17
Attending: INTERNAL MEDICINE
Payer: COMMERCIAL

## 2023-04-17 ENCOUNTER — OFFICE VISIT (OUTPATIENT)
Dept: RHEUMATOLOGY | Facility: CLINIC | Age: 43
End: 2023-04-17

## 2023-04-17 VITALS
WEIGHT: 234.5 LBS | HEIGHT: 71 IN | HEART RATE: 76 BPM | BODY MASS INDEX: 32.83 KG/M2 | RESPIRATION RATE: 16 BRPM | SYSTOLIC BLOOD PRESSURE: 120 MMHG | DIASTOLIC BLOOD PRESSURE: 75 MMHG

## 2023-04-17 DIAGNOSIS — M45.9 ANKYLOSING SPONDYLITIS, UNSPECIFIED SITE OF SPINE (HCC): ICD-10-CM

## 2023-04-17 DIAGNOSIS — Z51.81 THERAPEUTIC DRUG MONITORING: ICD-10-CM

## 2023-04-17 DIAGNOSIS — E55.9 VITAMIN D DEFICIENCY: ICD-10-CM

## 2023-04-17 DIAGNOSIS — M45.9 ANKYLOSING SPONDYLITIS, UNSPECIFIED SITE OF SPINE (HCC): Primary | ICD-10-CM

## 2023-04-17 LAB
ALBUMIN SERPL-MCNC: 3.8 G/DL (ref 3.4–5)
ALBUMIN/GLOB SERPL: 0.9 {RATIO} (ref 1–2)
ALP LIVER SERPL-CCNC: 99 U/L
ALT SERPL-CCNC: 35 U/L
ANION GAP SERPL CALC-SCNC: 1 MMOL/L (ref 0–18)
AST SERPL-CCNC: 22 U/L (ref 15–37)
BILIRUB SERPL-MCNC: 0.4 MG/DL (ref 0.1–2)
BUN BLD-MCNC: 17 MG/DL (ref 7–18)
BUN/CREAT SERPL: 14.5 (ref 10–20)
CALCIUM BLD-MCNC: 9 MG/DL (ref 8.5–10.1)
CHLORIDE SERPL-SCNC: 107 MMOL/L (ref 98–112)
CO2 SERPL-SCNC: 30 MMOL/L (ref 21–32)
CREAT BLD-MCNC: 1.17 MG/DL
CRP SERPL-MCNC: <0.29 MG/DL (ref ?–0.3)
DEPRECATED RDW RBC AUTO: 38.6 FL (ref 35.1–46.3)
ERYTHROCYTE [DISTWIDTH] IN BLOOD BY AUTOMATED COUNT: 12 % (ref 11–15)
ERYTHROCYTE [SEDIMENTATION RATE] IN BLOOD: 11 MM/HR
FASTING STATUS PATIENT QL REPORTED: NO
GFR SERPLBLD BASED ON 1.73 SQ M-ARVRAT: 80 ML/MIN/1.73M2 (ref 60–?)
GLOBULIN PLAS-MCNC: 4.2 G/DL (ref 2.8–4.4)
GLUCOSE BLD-MCNC: 102 MG/DL (ref 70–99)
HCT VFR BLD AUTO: 47.3 %
HGB BLD-MCNC: 15.7 G/DL
MCH RBC QN AUTO: 29 PG (ref 26–34)
MCHC RBC AUTO-ENTMCNC: 33.2 G/DL (ref 31–37)
MCV RBC AUTO: 87.4 FL
OSMOLALITY SERPL CALC.SUM OF ELEC: 288 MOSM/KG (ref 275–295)
PLATELET # BLD AUTO: 318 10(3)UL (ref 150–450)
POTASSIUM SERPL-SCNC: 3.9 MMOL/L (ref 3.5–5.1)
PROT SERPL-MCNC: 8 G/DL (ref 6.4–8.2)
RBC # BLD AUTO: 5.41 X10(6)UL
SODIUM SERPL-SCNC: 138 MMOL/L (ref 136–145)
VIT D+METAB SERPL-MCNC: 30.3 NG/ML (ref 30–100)
WBC # BLD AUTO: 9.3 X10(3) UL (ref 4–11)

## 2023-04-17 PROCEDURE — 3074F SYST BP LT 130 MM HG: CPT | Performed by: INTERNAL MEDICINE

## 2023-04-17 PROCEDURE — 86480 TB TEST CELL IMMUN MEASURE: CPT

## 2023-04-17 PROCEDURE — 82306 VITAMIN D 25 HYDROXY: CPT

## 2023-04-17 PROCEDURE — 36415 COLL VENOUS BLD VENIPUNCTURE: CPT

## 2023-04-17 PROCEDURE — 99214 OFFICE O/P EST MOD 30 MIN: CPT | Performed by: INTERNAL MEDICINE

## 2023-04-17 PROCEDURE — 3078F DIAST BP <80 MM HG: CPT | Performed by: INTERNAL MEDICINE

## 2023-04-17 PROCEDURE — 80053 COMPREHEN METABOLIC PANEL: CPT

## 2023-04-17 PROCEDURE — 86140 C-REACTIVE PROTEIN: CPT

## 2023-04-17 PROCEDURE — 85027 COMPLETE CBC AUTOMATED: CPT

## 2023-04-17 PROCEDURE — 3008F BODY MASS INDEX DOCD: CPT | Performed by: INTERNAL MEDICINE

## 2023-04-17 PROCEDURE — 85652 RBC SED RATE AUTOMATED: CPT

## 2023-04-17 RX ORDER — METHYLPREDNISOLONE 4 MG/1
TABLET ORAL
Qty: 1 EACH | Refills: 0 | Status: SHIPPED | OUTPATIENT
Start: 2023-04-17

## 2023-04-17 RX ORDER — IBUPROFEN 800 MG/1
800 TABLET ORAL EVERY 8 HOURS PRN
Qty: 90 TABLET | Refills: 1 | Status: SHIPPED | OUTPATIENT
Start: 2023-04-17

## 2023-04-17 NOTE — PATIENT INSTRUCTIONS
1.  Hold on enbrel for now - he feels good off enbrel and his psoriatic rash is better. 2.  Ibuprofen 800mg every 8 hours as needed   3. Medrol navdeep when needed   4. Return to clinic in 6-12 months. 5. Topical cortisone cream for rash  6.  Ok to get the vaccines -       Rinvoq/xeljanz - jorge inhibitor -   Taltz/cosentyx- anti IL17  Simponi/cimzia /remicade - TNF inihibtor

## 2023-04-19 LAB
M TB IFN-G CD4+ T-CELLS BLD-ACNC: 0 IU/ML
M TB TUBERC IFN-G BLD QL: NEGATIVE
M TB TUBERC IGNF/MITOGEN IGNF CONTROL: >10 IU/ML
QFT TB1 AG MINUS NIL: 0 IU/ML
QFT TB2 AG MINUS NIL: 0 IU/ML

## 2024-05-20 ENCOUNTER — LAB ENCOUNTER (OUTPATIENT)
Dept: LAB | Facility: HOSPITAL | Age: 44
End: 2024-05-20
Attending: INTERNAL MEDICINE

## 2024-05-20 ENCOUNTER — OFFICE VISIT (OUTPATIENT)
Dept: RHEUMATOLOGY | Facility: CLINIC | Age: 44
End: 2024-05-20

## 2024-05-20 ENCOUNTER — TELEPHONE (OUTPATIENT)
Dept: RHEUMATOLOGY | Facility: CLINIC | Age: 44
End: 2024-05-20

## 2024-05-20 VITALS
HEIGHT: 71 IN | HEART RATE: 75 BPM | WEIGHT: 243.81 LBS | SYSTOLIC BLOOD PRESSURE: 129 MMHG | BODY MASS INDEX: 34.13 KG/M2 | DIASTOLIC BLOOD PRESSURE: 88 MMHG | RESPIRATION RATE: 16 BRPM

## 2024-05-20 DIAGNOSIS — Z51.81 THERAPEUTIC DRUG MONITORING: ICD-10-CM

## 2024-05-20 DIAGNOSIS — M45.9 ANKYLOSING SPONDYLITIS, UNSPECIFIED SITE OF SPINE (HCC): ICD-10-CM

## 2024-05-20 DIAGNOSIS — M45.9 ANKYLOSING SPONDYLITIS, UNSPECIFIED SITE OF SPINE (HCC): Primary | ICD-10-CM

## 2024-05-20 LAB
ALBUMIN SERPL-MCNC: 4.3 G/DL (ref 3.2–4.8)
ALBUMIN/GLOB SERPL: 1.4 {RATIO} (ref 1–2)
ALP LIVER SERPL-CCNC: 92 U/L
ALT SERPL-CCNC: 52 U/L
ANION GAP SERPL CALC-SCNC: 4 MMOL/L (ref 0–18)
AST SERPL-CCNC: 30 U/L (ref ?–34)
BILIRUB SERPL-MCNC: 0.6 MG/DL (ref 0.3–1.2)
BUN BLD-MCNC: 12 MG/DL (ref 9–23)
BUN/CREAT SERPL: 12 (ref 10–20)
CALCIUM BLD-MCNC: 9.3 MG/DL (ref 8.7–10.4)
CHLORIDE SERPL-SCNC: 106 MMOL/L (ref 98–112)
CO2 SERPL-SCNC: 29 MMOL/L (ref 21–32)
CREAT BLD-MCNC: 1 MG/DL
CRP SERPL-MCNC: <0.4 MG/DL (ref ?–1)
DEPRECATED RDW RBC AUTO: 37.7 FL (ref 35.1–46.3)
EGFRCR SERPLBLD CKD-EPI 2021: 96 ML/MIN/1.73M2 (ref 60–?)
ERYTHROCYTE [DISTWIDTH] IN BLOOD BY AUTOMATED COUNT: 11.9 % (ref 11–15)
ERYTHROCYTE [SEDIMENTATION RATE] IN BLOOD: 13 MM/HR
FASTING STATUS PATIENT QL REPORTED: NO
GLOBULIN PLAS-MCNC: 3.1 G/DL (ref 2–3.5)
GLUCOSE BLD-MCNC: 85 MG/DL (ref 70–99)
HCT VFR BLD AUTO: 44.8 %
HGB BLD-MCNC: 15.4 G/DL
MCH RBC QN AUTO: 29.8 PG (ref 26–34)
MCHC RBC AUTO-ENTMCNC: 34.4 G/DL (ref 31–37)
MCV RBC AUTO: 86.7 FL
OSMOLALITY SERPL CALC.SUM OF ELEC: 287 MOSM/KG (ref 275–295)
PLATELET # BLD AUTO: 259 10(3)UL (ref 150–450)
POTASSIUM SERPL-SCNC: 3.9 MMOL/L (ref 3.5–5.1)
PROT SERPL-MCNC: 7.4 G/DL (ref 5.7–8.2)
RBC # BLD AUTO: 5.17 X10(6)UL
SODIUM SERPL-SCNC: 139 MMOL/L (ref 136–145)
WBC # BLD AUTO: 7.2 X10(3) UL (ref 4–11)

## 2024-05-20 PROCEDURE — 3074F SYST BP LT 130 MM HG: CPT | Performed by: INTERNAL MEDICINE

## 2024-05-20 PROCEDURE — 36415 COLL VENOUS BLD VENIPUNCTURE: CPT

## 2024-05-20 PROCEDURE — 99214 OFFICE O/P EST MOD 30 MIN: CPT | Performed by: INTERNAL MEDICINE

## 2024-05-20 PROCEDURE — G2211 COMPLEX E/M VISIT ADD ON: HCPCS | Performed by: INTERNAL MEDICINE

## 2024-05-20 PROCEDURE — 86140 C-REACTIVE PROTEIN: CPT

## 2024-05-20 PROCEDURE — 80053 COMPREHEN METABOLIC PANEL: CPT

## 2024-05-20 PROCEDURE — 3079F DIAST BP 80-89 MM HG: CPT | Performed by: INTERNAL MEDICINE

## 2024-05-20 PROCEDURE — 85652 RBC SED RATE AUTOMATED: CPT

## 2024-05-20 PROCEDURE — 86480 TB TEST CELL IMMUN MEASURE: CPT

## 2024-05-20 PROCEDURE — 85027 COMPLETE CBC AUTOMATED: CPT

## 2024-05-20 PROCEDURE — 3008F BODY MASS INDEX DOCD: CPT | Performed by: INTERNAL MEDICINE

## 2024-05-20 RX ORDER — METHOTREXATE 2.5 MG/1
5 TABLET ORAL WEEKLY
Qty: 30 TABLET | Refills: 0 | Status: SHIPPED | OUTPATIENT
Start: 2024-05-20 | End: 2024-08-18

## 2024-05-20 NOTE — PROGRESS NOTES
Homer Wang is a 44 year old male who presents for   Chief Complaint   Patient presents with    Ankylosing Spondylitis    Medication Follow-Up     Discuss treatment     Hip Pain     Left    Back Pain     Lower      HPI:     I had the pleasure of seeing Homer Wang on 2/7/2018 for evaluation. He was referred by Dr. Turk.     He is a pleasant 41 year old who has ankylosing spondylitis and recurrent iritis.   Over the last 1 1/2 years he has had two bouts of iritis.   It was his left eye 1 1/2 year ago.   Then recnetly it was his right eye around 12/27  he is using steroid. It's feeling better. He's on 1 drop per day still and this is his last week of it.   He doesn't remember any other issues with his eyes. He had contacts since age 13 and sometimes he would get red eyes but thought it was the contacts.   He still wears contacts.     He has had joint pain -   Starting at age 20 ,he had an accident with his right hip.   He had a msucle tear of his right hip. His right hip always troubles him since like pain and gait imbalance.   He felt more pain in the last 1 1/2 years. He went to see Dr. Helton at [facility] and he was told he might need hip revision surgery.   He did get an mri 7/2016 - that showed cartilage loss in right hip but no synvoitis noted.   He does tget times where pain will be there for 3 months.   He did get a steorid injeciton in right hip in 7/2016   Then sometimes at night he gets middle of his back pain for the last 10 years. It happens 3 times a week. If he exercises more frequently it tends to not show up.   He recnetly had right shoulder pain x 3 months.   He hasn't played hockey in the last 3 months. He's on a men's league.     He is taking naproxen as well.   He takes ibuporfeon 600mg bid 3-4 times a week for his hip. hes' been doing this for several years.   He can take this for his back as well.       His hlab27 is positive.  He's now started humira on 3/1/2018        2/26/2020  He's been doing good.   He has no infections.   He feels health on the humira 40mg every other week.   He feels at times his skin gets scaly.   His hip is doing good.   He is coaching hockey now.   No iriris.   Quality of life is better. - can tie his shoe now.     4/29/2020  VIDEO VISIT  He messaged on 4/24/2020 - having a flare. Sent in a medrol navdeep. The last 3 humira shots have not seemed to alleviate the flare.   Over the last month, he felt his rib cage was sore and tight. He was sore with breathing in and out. It was more on his left side - around heart. Also he was getting left shoudler pain as well. He took ibuprofen and this went away after 1-2 weeks. He then starred to get more buttocks and lower back pain. He has had rashes on his groin area and daljit of his gluteal fold. He has never had a rash while on humira. This rashe is somewhat new. hes' using cortisone cream and that's better. The rash is 1 inch long and red. It's not increased with the timing of the humira.   The back pain is recent in the last 1 week - it woke him up in the morning like he was havining inintially with AS.   It's int he lower middle part of back and going to left buttocks this time.   He had 3-4 days in a row that he couldn't move and he was very stiff and terrible.   He was starting to jog 2 miles a day prior to this.   He is working from home and jogging daily. Around 3/20 he was doing this x 2 weeks. Over the last month it's not like this.   His eyes are fine - no flare of his iritis.   The rib pain is gone after 2 weeks. He was taking ibuporfen and tyelnol for this.   He called and gave a steroid navdeep and he is much better.   He feels back to normal today at day 3 on the pack.   He had 8/10 pain in his back.he could barely get up and move and even go up the stairs.  Now it's back to normal and has 1/10 pain now.   Time to time he's had pain in his wrist and heel over the last month.     4/9/2021  He's been  doing better on enbrel 50mg a week. He's doing well.   He stopped playing hockey and he stopped the kids [employer]oh[employer] for this year.   He got the covid vaccine on 4/5/2021  He switched to enbrel in 5/2021.   At the same time he had right eye iritis flare in 5/2020 - and then got better in 6/2020. He took the steorid drops and it got better.   At that time he had rib pain and heel pain   All of it got better.     12/7/2021  He had left eye iritis come back in 9/2021 to 10/2021 he used steroid drops from dr. Downs.   He didn't lose eye sight and it's not red or  iriitated but he has a dull pain still.   The teeth also hurts - during the flare up.   It hurts still a litltei bit. He's taking advila like andrea - 12 advil daily  - 3 tabs every 6 hours.   He was taking 6 tableta d ay until th elast 4 days - he is having a dull pain behind his eye.   It's helping.     He feels his iritis is triggered with stress at work.   The eye drops takes care of most of it. 3 weeks in - his sight returns.     He had a bad flare up in 3/2020 - that was worse.   He thought this was milder.   He has no joint pain - and no back pain or spinal pain.     4/17/2023  He stopped enbrel 3 months ago.   He went through a root canal in thansgiving time. One tooth he had a crown done in august. And then it was unbearable pain again in a tooth thre.   He needed abx. He was on enbrel during this time. There was till a lot of pain  It's not as bad as it was.   He switch jobs. So he has been off enbrel b/c of the insurance change.   He feels actually fine .   He had one episode of iritis one time last year in 11/2022 - he took the eye drops for 1 mot   He still has a little pain in his right side of this tooth      The rashes went away off enbrel - it was in his groin region a little bit. - it was like psoriasis.   He also he felt that when he took the enbrel the psoriasis would go away and then come back by the time he was due again.   Since being off -  the back is more stiff.     He's out of the house with his new job. He feels more energy.   He feels almost normal -   No more brain fog.   He has not felt as good on the enbrel as he did with the humira.     5/20/2024  He is not feeling well this last 1 year.   The left hip is hurting really bad.   Evern since off enbrel - no teeth issues and no iritis.   But now the left hip is bothering him for the last 8 months.   He didn't feel good with enbrel or humira really. He was getting psoriatic rash. His iritis is under control.   He is eating good and eating clean and taking elements.     When he feels good he is able to do thingsb tu when his left hip hurts he has lost motivation.   He has more fatigue the last 2months.     Wt Readings from Last 2 Encounters:   05/20/24 243 lb 12.8 oz (110.6 kg)   04/17/23 234 lb 8 oz (106.4 kg)     Body mass index is 34 kg/m².      Current Outpatient Medications   Medication Sig Dispense Refill    ibuprofen 800 MG Oral Tab Take 1 tablet (800 mg total) by mouth every 8 (eight) hours as needed for Pain. 90 tablet 1    cetirizine 10 MG Oral Tab Take 1 tablet (10 mg total) by mouth daily.        Past Medical History:    Acute iritis of left eye    Acute iritis of left eye    Acute iritis of right eye    Ankylosing spondylitis (HCC)    Diagnosis of Ankylosing spondylitis per Dr. Luna    Arthritis      Past Surgical History:   Procedure Laterality Date    Extraction erupted tooth/exr      Root surgery molar        Family History   Problem Relation Age of Onset    Glaucoma Paternal Grandfather     Heart Disorder Paternal Grandfather     Other (Other) Mother         Ankylosing spondylitis    Diabetes Neg     Macular degeneration Neg    he notes the AS in his mom is in her neck. Has 2 younger brothers and a sister     Social History:  Social History     Socioeconomic History    Marital status:    Tobacco Use    Smoking status: Never    Smokeless tobacco: Never   Substance and  Sexual Activity    Alcohol use: Yes     Alcohol/week: 0.0 standard drinks of alcohol     Comment: Weekly    Drug use: No   Other Topics Concern    Caffeine Concern Yes     Comment: Coffee, 1 cup daily    2 children,   ,   [employer] software project enginer,        REVIEW OF SYSTEMS:   Review Of Systems:  Fatigue  Constitutional:No fever, no change in weight or appetitie  Derm: No rashes, used to get  oral ulcers, not now, no alopecia, no photosensitivity, no psoriasis  He gets dry skin on his thighs.   HEENT: No dry eyes, no dry mouth, no Raynaud's, no nasal ulcers, no parotid swelling, no neck pain, no jaw pain, no temple pain  Eyes: No visual changes,   CVS: No chest pain, no heart disease  RS: No SOB, no Cough, No Pleurtic pain,   GI: No nausea, no vomiiting, no abominal pain, no hx of ulcer, no gastritis, no heartburn, no dyshpagia, no BRBPR or melena  When he eats breads or sandwhiches - he can get heart burn.   : no dysuria,   Neuro: No numbness or tingling, no headache, no hx of seizures,   Psych: no hx of anxiety or depression  ENDO: no hx of thyroid disease, no hx of DM  Joint/Muscluskeltal: see HPI, when he was younger he used to get folloiciulitis it goes away -   All other ROS are negative.     EXAM:   /88   Pulse 75   Resp 16   Ht 5' 11\" (1.803 m)   Wt 243 lb 12.8 oz (110.6 kg)   BMI 34.00 kg/m²    pleasant, no acute distress, no CAD, no neck tendnerness, good ROM,   No rashes  CVS: RRR  RS: CTAB  ABD: Soft   Joint exam:   No neck tenderness   No si joint tendnerss  Finger to floor about 6 cm   Right hip tender -   Not tender in hips or lower back at this time   No achilles tenderness  No edema      Component      Latest Ref Rng & Units 2/7/2018   Glucose      70 - 99 mg/dL 98   Sodium      136 - 144 mmol/L 140   Potassium      3.3 - 5.1 mmol/L 4.0   Chloride      95 - 110 mmol/L 104   Carbon Dioxide, Total      22 - 32 mmol/L 29   BUN      8 - 20 mg/dL 14   CREATININE      0.50 - 1.50  mg/dL 0.99   CALCIUM      8.5 - 10.5 mg/dL 9.0   ALT (SGPT)      17 - 63 U/L 40   AST (SGOT)      15 - 41 U/L 29   ALKALINE PHOSPHATASE      32 - 100 U/L 75   Total Bilirubin      0.3 - 1.2 mg/dL 0.3   TOTAL PROTEIN      5.9 - 8.4 g/dL 6.8   Albumin      3.5 - 4.8 g/dL 4.3   GLOBULIN, TOTAL      2.5 - 3.7 g/dL 2.5   A/G RATIO      1.0 - 2.0 1.7   ANION GAP      0 - 18 mmol/L 7   BUN/CREA RATIO      10.0 - 20.0 14.1   CALCULATED OSMOLALITY      275 - 295 mOsm/kg 290   GFR, Non-      >=60 >60   GFR, -American      >=60 >60   Patient Fasting?       No   WBC      4.0 - 11.0 K/UL 5.9   RBC      4.50 - 5.90 M/UL 5.34   Hemoglobin      13.5 - 17.5 g/dL 15.8   Hematocrit      41.0 - 52.0 % 46.0   MCV      80.0 - 100.0 fL 86.2   MCH      27.0 - 32.0 pg 29.7   MCHC      32.0 - 37.0 g/dl 34.4   RDW      11.0 - 15.0 % 12.7   Platelet Count      140 - 400 K/   MEAN PLATELET VOLUME      7.4 - 10.3 fL 8.7   Quantiferon TB Gold NIL      IU/mL 0.061   Quantiferon TB Gold TB-NIL      IU/mL 0.020   Quantiferon TB Gold KIRAN-NIL      IU/mL >10.000   QTB.RESULT      Negative Negative   C-REACTIVE PROTEIN      0.0 - 0.9 mg/dL <0.5   SED RATE      0 - 15 mm/Hr 4   CK      49 - 397 U/L 180   LYSOZYME, SERUM OR BODY FLUID      0.00 - 2.75 ug/mL 1.20   OSWALDO SCREEN      Negative Negative   HCV AB      Nonreactive Nonreactive   Lyme Screen IgG and IgM      Negative Negative   HBc IgM AB      Nonreactive Nonreactive   HBSAG SCREEN      Nonreactive Nonreactive   ANGIOTENSIN CONVERTING ENZYME,      9 - 67 U/L 59   C-Citrullinated Peptide IgG AB      0.0 - 6.9 U/mL 2.0   HLA-B27      Negative Positive (A)   RHEUMATOID FACTOR      <11 IU/mL <5   TREPONEMAL ANTIBODIES (S)      Negative Negative       Component      Latest Ref Rng 5/10/2022   Glucose      70 - 99 mg/dL 102 (H)    Sodium      136 - 145 mmol/L 140    Potassium      3.5 - 5.1 mmol/L 4.1    Chloride      98 - 112 mmol/L 107    Carbon Dioxide, Total      21.0 -  32.0 mmol/L 29.0    ANION GAP      0 - 18 mmol/L 4    BUN      7 - 18 mg/dL 16    CREATININE      0.70 - 1.30 mg/dL 1.15    BUN/CREATININE RATIO      10.0 - 20.0  13.9    CALCIUM      8.5 - 10.1 mg/dL 8.8    CALCULATED OSMOLALITY      275 - 295 mOsm/kg 291    eGFR NON-AFR. AMERICAN      >=60  79    eGFR       >=60  91    ALT (SGPT)      16 - 61 U/L 42    AST (SGOT)      15 - 37 U/L 25    ALKALINE PHOSPHATASE      45 - 117 U/L 85    Total Bilirubin      0.1 - 2.0 mg/dL 0.4    PROTEIN, TOTAL      6.4 - 8.2 g/dL 7.2    Albumin      3.4 - 5.0 g/dL 3.5    Globulin      2.8 - 4.4 g/dL 3.7    A/G Ratio      1.0 - 2.0  0.9 (L)    Patient Fasting for CMP? No    WBC      4.0 - 11.0 x10(3) uL 6.8    RBC      4.30 - 5.70 x10(6)uL 5.07    Hemoglobin      13.0 - 17.5 g/dL 15.1    Hematocrit      39.0 - 53.0 % 44.4    MCV      80.0 - 100.0 fL 87.6    MCH      26.0 - 34.0 pg 29.8    MCHC      31.0 - 37.0 g/dL 34.0    RDW      11.0 - 15.0 % 12.0    RDW-SD      35.1 - 46.3 fL 38.5    Platelet Count      150.0 - 450.0 10(3)uL 232.0    Quantiferon TB NIL      IU/mL 0.04    Quantiferon-TB1 Minus NIL      IU/mL 0.00    Quantiferon-TB2 Minus NIL      IU/mL -0.01    Quantiferon TB Mitogen minus NIL      IU/mL >10.00    Quantiferon TB Result      Negative  Negative    C-REACTIVE PROTEIN      <0.30 mg/dL <0.29    SED RATE      0 - 15 mm/Hr 10    VITAMIN D, 25-OH, TOTAL      30.0 - 100.0 ng/mL 22.5 (L)       Legend:  (H) High  (L) Low      2/26/2018 - MRI si joints    1. Bilateral sacroillitis  2. Mild changes of degenerative disc disease in the lower lumbar spine    [place] right [place]:7/5/2016 -   IMPRESSION:    Changes of osteoarthritis with focal areas of full-thickness cartilage loss in the weightbearing area of the joint.     Partial-thickness undersurface tear of the anterior superior labrum.    Bone marrow edema is present in the weightbearing area of the acetabulum deep to an area of full-thickness articular  cartilage loss.  FINAL REPORT  THE ATTENDING RADIOLOGIST INTERPRETED THIS STUDY WITH THE RESIDENT  WHOSE NAME APPEARS BELOW, AND FULLY AGREES     8/31/2016 - ct lower extremity  Findings of the right hip which may represent a right acetabular  impingement. The femoral anteversion angle is approximately     ASSESSMENT AND PLAN:   Homer Wang is a 44 year old male who presents for   Chief Complaint   Patient presents with    Ankylosing Spondylitis    Medication Follow-Up     Discuss treatment     Hip Pain     Left    Back Pain     Lower      1. Ankylosing sponydlitis with sacroillitiis, nd Recurrent irtiis - hx of intnermittent back pain and righ thip pain, chest and right shoulder  - hlab27 positive - c/w AS -   Fh of AS    Since he is feeling much better with medrol navdeep given on 4/27/2020    Cont. enbrel 50mg a week 5/2020 - 12/2022   He's off enbrel and was  feeling less joint pain off enbrel but now increasing pain -   He felt he had some side effects while enbrel - more rashes - wierdly he also had improvement week to week with enbrel but now it's all gone when he compeltely stopped.   Also his job stress has imrpoved at the same time.   Only one episode of iritis this year in 11/202 2- improved on drops for 1 month.   He feels better off the enbrel than in the last few years   He will stay off enbrel b/c he drastically feels better - there was only partial improvement with enbrel compared to humira   He will try a different medication if his pain comes back -   Consider another TNF or can consider anti IL17 and KENDRICK inhibitor if hte pain comes back   Consider cosentyx 300mg a month with loading doses,   Since pain is back will try remicade - with loading doses then 5mg/kg every 8 weeks.   Discussed with patient risks and benefits of the medications, including TB risk, malignancy risk and infection risk.     Add methotrexate 2 pills a week to help prevent abx   Discussed with patient risks and benefits,  including hepatoxicity risk, teratogenicity -  not to get pregnant on the medication and infections risk.    He wishes not to go back on enbrel.   He has a trip to [facility] in august - d/w him ok to get vaccines and ok to take medrol navdeep with him in case.   He can take ibuprofen 800mg prn -       Rtc in 6-12 months will see how his iritis occurs this year   Check labs in 4/2022  Stopped . humira 40mg every 2 weeks for now. In 5/2020     - ongoing hip osteoarthriits - that's gradually worsening - looking at hip replacments with dr. carr at [facility] - no need at this time -   - was told to get arthroscopic sx in 2016 but postponed   - plan to started on 3/1/2018 -  humira 40mg every 2 weeks.     Dw/ him about ongoing stretching and therapy -      2. oa of hips - had xrays with dr. carr and did not get the arthrsocpic sx - in 2016 -     3. iriits - stable on enbrel, was better on humira 40mg every 2 weeks.     4. Sister - recently dx with psoriatic arthriits.   5. New onsent rash - could be psoriatis - - maybe due to humria- this ibetter off humira.and enbrel        Summary:  1.  Try remicade 5mg/kg 0,2,and 6 weeks loading doses - then every 8 weeks.   2.  Ibuprofen 800mg every 8 hours as needed   3.  Add methtorexate 2 pills a week to prevent antibodies   4.  Return to clinic in 8 -12 weeks.    5. Topical cortisone cream for rash      Anival Álvarez MD  5/20/2024   2:53 PM    Addednum:   Pt. Emailed and stated he didn't want remicade and wanted to wait. He wants to wait for a cure for this - he has read about a drug that he wants to get into a clinica trial   Referred pt. To dr. Buck at [facility] to see if clinical trials available.   Anival Álvarez MD  5/29/2024   5:24 AM       is applicable because the patient's medical record notes reflects the ongoing nature of the continuous longitudinal relationship of care, and the medical record indicates that there is ongoing treatment of a  serious/complex medical condition.

## 2024-05-20 NOTE — PATIENT INSTRUCTIONS
1.  Try remicade 5mg/kg 0,2,and 6 weeks loading doses - then every 8 weeks.   2.  Ibuprofen 800mg every 8 hours as needed   3.  Add methtorexate 2 pills a week to prevent antibodies   4.  Return to clinic in 8-12  weeks.    5. Topical cortisone cream for rash

## 2024-05-21 ENCOUNTER — PATIENT MESSAGE (OUTPATIENT)
Dept: RHEUMATOLOGY | Facility: CLINIC | Age: 44
End: 2024-05-21

## 2024-05-21 DIAGNOSIS — G89.29 CHRONIC LOW BACK PAIN WITHOUT SCIATICA, UNSPECIFIED BACK PAIN LATERALITY: ICD-10-CM

## 2024-05-21 DIAGNOSIS — M45.9 ANKYLOSING SPONDYLITIS, UNSPECIFIED SITE OF SPINE (HCC): Primary | ICD-10-CM

## 2024-05-21 DIAGNOSIS — M54.50 CHRONIC LOW BACK PAIN WITHOUT SCIATICA, UNSPECIFIED BACK PAIN LATERALITY: ICD-10-CM

## 2024-05-21 NOTE — TELEPHONE ENCOUNTER
From: Homer Wang  To: Anival Hernandez  Sent: 5/21/2024 9:53 AM CDT  Subject: Physical Therapy    Hi Dr. MAKI,     Thank you for talking with me yesterday about the options. After further consideration and research, as well as based on my test results, which are pretty reasonable, I don't want to move forward with Remicade and Methotrexate. I just don't like the side effects I'm researching and have felt before. I would like to try to go through some physical therapy and lose some weight. Can you write me an order for physical therapy instead? Sorry for the change in heart, I have kimmy that better treatments will become available at some point, maybe even a cure. I have hopes that Dl ([id number]) from [employer] may be the answer.     Homer Ambriz

## 2024-05-22 ENCOUNTER — PATIENT MESSAGE (OUTPATIENT)
Dept: RHEUMATOLOGY | Facility: CLINIC | Age: 44
End: 2024-05-22

## 2024-05-22 DIAGNOSIS — M45.9 ANKYLOSING SPONDYLITIS, UNSPECIFIED SITE OF SPINE (HCC): Primary | ICD-10-CM

## 2024-05-22 LAB
M TB IFN-G CD4+ T-CELLS BLD-ACNC: 0.04 IU/ML
M TB TUBERC IFN-G BLD QL: NEGATIVE
M TB TUBERC IGNF/MITOGEN IGNF CONTROL: >10 IU/ML
QFT TB1 AG MINUS NIL: 0.01 IU/ML
QFT TB2 AG MINUS NIL: 0.01 IU/ML

## 2024-05-22 NOTE — TELEPHONE ENCOUNTER
From: Homer Wang  To: Anival Hernandez  Sent: 5/22/2024 2:02 PM CDT  Subject: Referral Request for Dr. Rd Buck - [facility] Dr. MAKI,    Dr. Rd Buck's office at [facility] requires a referral. Can you kindly fax a referral to his office at [phone]?    Thank you,    Homer

## 2024-05-22 NOTE — TELEPHONE ENCOUNTER
Per Patient Message dated 5/22/24, Pt wants to cancel the Remicade order; ok'd by Dr Álvarez.    Info routed to PPD and Treatment Plan cancelled.   
Plan to start remicade 5mg/kg - laoding doses 0,2, 6 weeks, then every 8 weeks     Will need prior auth  
Treatment plan generated. Provider to review and sign. PPD please assist with authorization.   
room air

## 2024-05-28 NOTE — TELEPHONE ENCOUNTER
Referral was fax to Dr. Rd Buck's office at [facility] at [phone] as requested.

## 2024-06-24 ENCOUNTER — APPOINTMENT (OUTPATIENT)
Dept: PHYSICAL THERAPY | Facility: HOSPITAL | Age: 44
End: 2024-06-24
Attending: INTERNAL MEDICINE
Payer: COMMERCIAL

## 2024-06-27 ENCOUNTER — APPOINTMENT (OUTPATIENT)
Dept: PHYSICAL THERAPY | Facility: HOSPITAL | Age: 44
End: 2024-06-27
Attending: INTERNAL MEDICINE
Payer: COMMERCIAL

## 2024-07-01 ENCOUNTER — APPOINTMENT (OUTPATIENT)
Dept: PHYSICAL THERAPY | Facility: HOSPITAL | Age: 44
End: 2024-07-01
Attending: INTERNAL MEDICINE
Payer: COMMERCIAL

## 2024-07-02 ENCOUNTER — MED REC SCAN ONLY (OUTPATIENT)
Dept: RHEUMATOLOGY | Facility: CLINIC | Age: 44
End: 2024-07-02

## 2024-07-08 ENCOUNTER — APPOINTMENT (OUTPATIENT)
Dept: PHYSICAL THERAPY | Facility: HOSPITAL | Age: 44
End: 2024-07-08
Attending: INTERNAL MEDICINE
Payer: COMMERCIAL

## 2024-07-10 NOTE — TELEPHONE ENCOUNTER
Requested Prescriptions     Pending Prescriptions Disp Refills    ibuprofen 800 MG Oral Tab 90 tablet 1     Sig: Take 1 tablet (800 mg total) by mouth every 8 (eight) hours as needed for Pain.     No future appointments.    LOV: 5/20/24   Last Refilled:2/20/24 #90 1RF   Labs:  Component      Latest Ref Rng 5/20/2024   Glucose      70 - 99 mg/dL 85    Sodium      136 - 145 mmol/L 139    Potassium      3.5 - 5.1 mmol/L 3.9    Chloride      98 - 112 mmol/L 106    Carbon Dioxide, Total      21.0 - 32.0 mmol/L 29.0    ANION GAP      0 - 18 mmol/L 4    BUN      9 - 23 mg/dL 12    CREATININE      0.70 - 1.30 mg/dL 1.00    BUN/CREATININE RATIO      10.0 - 20.0  12.0    CALCIUM      8.7 - 10.4 mg/dL 9.3    CALCULATED OSMOLALITY      275 - 295 mOsm/kg 287    EGFR      >=60 mL/min/1.73m2 96    ALT (SGPT)      10 - 49 U/L 52 (H)    AST (SGOT)      <=34 U/L 30    ALKALINE PHOSPHATASE      45 - 117 U/L 92    Total Bilirubin      0.3 - 1.2 mg/dL 0.6    PROTEIN, TOTAL      5.7 - 8.2 g/dL 7.4    Albumin      3.2 - 4.8 g/dL 4.3    Globulin      2.0 - 3.5 g/dL 3.1    A/G Ratio      1.0 - 2.0  1.4    Patient Fasting for CMP? No    WBC      4.0 - 11.0 x10(3) uL 7.2    RBC      4.30 - 5.70 x10(6)uL 5.17    Hemoglobin      13.0 - 17.5 g/dL 15.4    Hematocrit      39.0 - 53.0 % 44.8    MCV      80.0 - 100.0 fL 86.7    MCH      26.0 - 34.0 pg 29.8    MCHC      31.0 - 37.0 g/dL 34.4    RDW      11.0 - 15.0 % 11.9    RDW-SD      35.1 - 46.3 fL 37.7    Platelet Count      150.0 - 450.0 10(3)uL 259.0    Quantiferon TB NIL      IU/mL 0.04    Quantiferon-TB1 Minus NIL      IU/mL 0.01    Quantiferon-TB2 Minus NIL      IU/mL 0.01    Quantiferon TB Mitogen minus NIL      IU/mL >10.00    Quantiferon TB Result      Negative  Negative    SED RATE      0 - 15 mm/Hr 13    C-REACTIVE PROTEIN      <1.00 mg/dL <0.40       Legend:  (H) High    Summary:  1.  Try remicade 5mg/kg 0,2,and 6 weeks loading doses - then every 8 weeks.   2.  Ibuprofen 800mg every 8  hours as needed   3.  Add methtorexate 2 pills a week to prevent antibodies   4.  Return to clinic in 8 -12 weeks.    5. Topical cortisone cream for rash        Anival Álvarez MD  5/20/2024   2:53 PM     Addednum:   Pt. Emailed and stated he didn't want remicade and wanted to wait. He wants to wait for a cure for this - he has read about a drug that he wants to get into a clinica trial   Referred pt. To dr. Buck at [facility] to see if clinical trials available.   Anival Álvarez MD  5/29/2024   5:24 AM

## 2024-07-11 ENCOUNTER — APPOINTMENT (OUTPATIENT)
Dept: PHYSICAL THERAPY | Facility: HOSPITAL | Age: 44
End: 2024-07-11
Attending: INTERNAL MEDICINE
Payer: COMMERCIAL

## 2024-07-11 RX ORDER — IBUPROFEN 800 MG/1
800 TABLET ORAL EVERY 8 HOURS PRN
Qty: 90 TABLET | Refills: 1 | Status: SHIPPED | OUTPATIENT
Start: 2024-07-11

## 2024-07-15 ENCOUNTER — APPOINTMENT (OUTPATIENT)
Dept: PHYSICAL THERAPY | Facility: HOSPITAL | Age: 44
End: 2024-07-15
Attending: INTERNAL MEDICINE
Payer: COMMERCIAL

## 2024-07-18 ENCOUNTER — APPOINTMENT (OUTPATIENT)
Dept: PHYSICAL THERAPY | Facility: HOSPITAL | Age: 44
End: 2024-07-18
Attending: INTERNAL MEDICINE
Payer: COMMERCIAL

## 2024-07-22 ENCOUNTER — APPOINTMENT (OUTPATIENT)
Dept: PHYSICAL THERAPY | Facility: HOSPITAL | Age: 44
End: 2024-07-22
Attending: INTERNAL MEDICINE
Payer: COMMERCIAL

## 2024-11-21 ENCOUNTER — MED REC SCAN ONLY (OUTPATIENT)
Dept: RHEUMATOLOGY | Facility: CLINIC | Age: 44
End: 2024-11-21

## (undated) NOTE — LETTER
May 21, 2020    Lexi Garsia MD  [address]  [address]     Patient: Tracy Atkins   YOB: 1980   Date of Visit: 5/21/2020       Dear Dr. Krista Correa MD:    Thank you for referring Ramiro Tirado to me for • ibuprofen 200 MG Oral Tab Take 600 mg by mouth nightly as needed for Pain. • Naproxen Sodium (ALEVE) 220 MG Oral Tab Take by mouth as needed. • Fexofenadine-Pseudoephed -240 MG Oral Tablet 24 Hr Take by mouth.          Allergies:  No Known A Document electronically generated by: Lam Tidwell

## (undated) NOTE — LETTER
January 10, 2018    Wallace Dickens MD  [address] [place] [id number]     Patient: Armando Betts   YOB: 1980   Date of Visit: 1/10/2018       Dear Dr. Mat Devlin MD:    Thank you for referring Jennifer Snow Lids/Lashes Meibomian gland dysfunction Meibomian gland dysfunction    Conjunctiva/Sclera Temp pinguecula, 1+ Injection Temp pinguecula, Trace Injection    Cornea trace KP on endo  Clear    Anterior Chamber deep and rare cell  Deep and quiet    Iris Ashley Dougherty

## (undated) NOTE — LETTER
January 23, 2018    Amy Desai MD  [address] [place] [address]  [place] [id number]     Patient: Michael Seymour   YOB: 1980   Date of Visit: 1/23/2018       Dear Dr. Jennifer French MD:    Thank you for referring Charly Petersen Right PERRL    Left PERRL            Slit Lamp and Fundus Exam     Slit Lamp Exam       Right Left    Lids/Lashes Meibomian gland dysfunction Meibomian gland dysfunction    Conjunctiva/Sclera Non-injected, Temp pinguecula Temp pinguecula, Trace Injection Document electronically generated by: Mundo Reyes

## (undated) NOTE — ED AVS SNAPSHOT
Jen Sawyer   MRN: [id number]    Department:  [facility] Emergency Department   Date of Visit:  4/2/2018           Disclosure     Insurance plans vary and the physician(s) referred by the ER may not be covered by your plan.  Please contact CARE PHYSICIAN AT ONCE OR RETURN IMMEDIATELY TO THE EMERGENCY DEPARTMENT. If you have been prescribed any medication(s), please fill your prescription right away and begin taking the medication(s) as directed.   If you believe that any of the medications

## (undated) NOTE — LETTER
4/2/2021              Homer Wang        [address] [place] [address] [place] [address]         To Whom It May Concern,         Dorian Garza is currently under my medical care for an autoimmune disease.  He should qualify to receive the COVID-19 v